# Patient Record
Sex: MALE | Race: WHITE | NOT HISPANIC OR LATINO | Employment: FULL TIME | URBAN - METROPOLITAN AREA
[De-identification: names, ages, dates, MRNs, and addresses within clinical notes are randomized per-mention and may not be internally consistent; named-entity substitution may affect disease eponyms.]

---

## 2020-12-17 ENCOUNTER — LAB VISIT (OUTPATIENT)
Dept: PRIMARY CARE CLINIC | Facility: OTHER | Age: 25
End: 2020-12-17
Attending: INTERNAL MEDICINE
Payer: OTHER GOVERNMENT

## 2020-12-17 DIAGNOSIS — Z03.818 ENCOUNTER FOR OBSERVATION FOR SUSPECTED EXPOSURE TO OTHER BIOLOGICAL AGENTS RULED OUT: ICD-10-CM

## 2020-12-17 PROCEDURE — U0003 INFECTIOUS AGENT DETECTION BY NUCLEIC ACID (DNA OR RNA); SEVERE ACUTE RESPIRATORY SYNDROME CORONAVIRUS 2 (SARS-COV-2) (CORONAVIRUS DISEASE [COVID-19]), AMPLIFIED PROBE TECHNIQUE, MAKING USE OF HIGH THROUGHPUT TECHNOLOGIES AS DESCRIBED BY CMS-2020-01-R: HCPCS

## 2020-12-18 LAB — SARS-COV-2 RNA RESP QL NAA+PROBE: NOT DETECTED

## 2021-01-30 ENCOUNTER — HOSPITAL ENCOUNTER (EMERGENCY)
Facility: HOSPITAL | Age: 26
Discharge: HOME | End: 2021-01-30
Attending: EMERGENCY MEDICINE
Payer: COMMERCIAL

## 2021-01-30 VITALS
SYSTOLIC BLOOD PRESSURE: 136 MMHG | DIASTOLIC BLOOD PRESSURE: 76 MMHG | RESPIRATION RATE: 18 BRPM | WEIGHT: 145 LBS | HEIGHT: 66 IN | HEART RATE: 80 BPM | TEMPERATURE: 98 F | BODY MASS INDEX: 23.3 KG/M2 | OXYGEN SATURATION: 99 %

## 2021-01-30 DIAGNOSIS — M79.673 FOOT PAIN: ICD-10-CM

## 2021-01-30 PROCEDURE — 99283 EMERGENCY DEPT VISIT LOW MDM: CPT | Mod: 25

## 2021-01-30 PROCEDURE — 25000003 PHARM REV CODE 250: Performed by: EMERGENCY MEDICINE

## 2021-01-30 RX ORDER — IBUPROFEN 400 MG/1
400 TABLET ORAL
Status: COMPLETED | OUTPATIENT
Start: 2021-01-30 | End: 2021-01-30

## 2021-01-30 RX ADMIN — IBUPROFEN 400 MG: 400 TABLET, FILM COATED ORAL at 03:01

## 2021-05-04 ENCOUNTER — PATIENT MESSAGE (OUTPATIENT)
Dept: RESEARCH | Facility: HOSPITAL | Age: 26
End: 2021-05-04

## 2021-05-10 ENCOUNTER — PATIENT MESSAGE (OUTPATIENT)
Dept: RESEARCH | Facility: HOSPITAL | Age: 26
End: 2021-05-10

## 2021-06-08 ENCOUNTER — OFFICE VISIT (OUTPATIENT)
Dept: FAMILY MEDICINE | Facility: CLINIC | Age: 26
End: 2021-06-08
Payer: COMMERCIAL

## 2021-06-08 VITALS
RESPIRATION RATE: 13 BRPM | HEIGHT: 66 IN | DIASTOLIC BLOOD PRESSURE: 60 MMHG | WEIGHT: 146.38 LBS | TEMPERATURE: 99 F | SYSTOLIC BLOOD PRESSURE: 100 MMHG | HEART RATE: 85 BPM | BODY MASS INDEX: 23.53 KG/M2 | OXYGEN SATURATION: 98 %

## 2021-06-08 DIAGNOSIS — M62.838 MUSCLE SPASM: ICD-10-CM

## 2021-06-08 DIAGNOSIS — K40.90 RIGHT INGUINAL HERNIA: ICD-10-CM

## 2021-06-08 DIAGNOSIS — S46.811A TRAPEZIUS MUSCLE STRAIN, RIGHT, INITIAL ENCOUNTER: ICD-10-CM

## 2021-06-08 DIAGNOSIS — R21 RASH: Primary | ICD-10-CM

## 2021-06-08 PROCEDURE — 1126F AMNT PAIN NOTED NONE PRSNT: CPT | Mod: S$GLB,,, | Performed by: NURSE PRACTITIONER

## 2021-06-08 PROCEDURE — 3008F BODY MASS INDEX DOCD: CPT | Mod: CPTII,S$GLB,, | Performed by: NURSE PRACTITIONER

## 2021-06-08 PROCEDURE — 3008F PR BODY MASS INDEX (BMI) DOCUMENTED: ICD-10-PCS | Mod: CPTII,S$GLB,, | Performed by: NURSE PRACTITIONER

## 2021-06-08 PROCEDURE — 1126F PR PAIN SEVERITY QUANTIFIED, NO PAIN PRESENT: ICD-10-PCS | Mod: S$GLB,,, | Performed by: NURSE PRACTITIONER

## 2021-06-08 PROCEDURE — 99203 PR OFFICE/OUTPT VISIT, NEW, LEVL III, 30-44 MIN: ICD-10-PCS | Mod: S$GLB,,, | Performed by: NURSE PRACTITIONER

## 2021-06-08 PROCEDURE — 99203 OFFICE O/P NEW LOW 30 MIN: CPT | Mod: S$GLB,,, | Performed by: NURSE PRACTITIONER

## 2021-06-08 RX ORDER — ESCITALOPRAM OXALATE 10 MG/1
TABLET ORAL
COMMUNITY
End: 2021-06-08 | Stop reason: ALTCHOICE

## 2021-06-08 RX ORDER — VALACYCLOVIR HYDROCHLORIDE 1 G/1
TABLET, FILM COATED ORAL
COMMUNITY
End: 2024-02-01

## 2021-06-08 RX ORDER — TIZANIDINE 2 MG/1
2 TABLET ORAL EVERY 8 HOURS PRN
Qty: 90 TABLET | Refills: 0 | Status: SHIPPED | OUTPATIENT
Start: 2021-06-08 | End: 2023-10-28

## 2021-06-08 RX ORDER — ESCITALOPRAM OXALATE 20 MG/1
20 TABLET ORAL DAILY
COMMUNITY
Start: 2021-05-27

## 2021-06-08 RX ORDER — TRETINOIN 0.1 MG/G
GEL TOPICAL
COMMUNITY
End: 2021-06-08

## 2021-06-08 RX ORDER — TRIAMCINOLONE ACETONIDE 1 MG/G
CREAM TOPICAL 2 TIMES DAILY
Qty: 80 G | Refills: 0 | Status: SHIPPED | OUTPATIENT
Start: 2021-06-08 | End: 2023-10-28

## 2021-06-08 RX ORDER — ESCITALOPRAM OXALATE 10 MG/1
10 TABLET ORAL DAILY
COMMUNITY
Start: 2021-02-22 | End: 2021-06-08 | Stop reason: SDUPTHER

## 2021-06-08 RX ORDER — CALCIPOTRIENE 50 UG/G
CREAM TOPICAL 2 TIMES DAILY
Qty: 120 G | Refills: 0 | Status: SHIPPED | OUTPATIENT
Start: 2021-06-08 | End: 2023-10-28

## 2021-06-08 RX ORDER — ACETAMINOPHEN AND CODEINE PHOSPHATE 300; 30 MG/1; MG/1
TABLET ORAL
COMMUNITY
Start: 2021-03-02 | End: 2021-06-08

## 2021-06-08 RX ORDER — ALBUTEROL SULFATE 90 UG/1
AEROSOL, METERED RESPIRATORY (INHALATION)
COMMUNITY
End: 2021-06-09 | Stop reason: SDUPTHER

## 2021-06-08 RX ORDER — AMOXICILLIN AND CLAVULANATE POTASSIUM 875; 125 MG/1; MG/1
TABLET, FILM COATED ORAL
COMMUNITY
Start: 2021-04-30 | End: 2021-06-08

## 2021-06-08 RX ORDER — IBUPROFEN 600 MG/1
600 TABLET ORAL EVERY 8 HOURS PRN
Qty: 42 TABLET | Refills: 0 | Status: SHIPPED | OUTPATIENT
Start: 2021-06-08

## 2021-06-08 RX ORDER — CLONAZEPAM 1 MG/1
1 TABLET ORAL
COMMUNITY

## 2021-06-09 DIAGNOSIS — J45.901 ASTHMA WITH ACUTE EXACERBATION, UNSPECIFIED ASTHMA SEVERITY, UNSPECIFIED WHETHER PERSISTENT: Primary | ICD-10-CM

## 2021-06-10 RX ORDER — ALBUTEROL SULFATE 90 UG/1
2 AEROSOL, METERED RESPIRATORY (INHALATION) EVERY 4 HOURS PRN
Qty: 18 G | Refills: 2 | Status: SHIPPED | OUTPATIENT
Start: 2021-06-10

## 2021-06-14 ENCOUNTER — HOSPITAL ENCOUNTER (OUTPATIENT)
Dept: RADIOLOGY | Facility: HOSPITAL | Age: 26
Discharge: HOME | End: 2021-06-14
Attending: NURSE PRACTITIONER
Payer: COMMERCIAL

## 2021-06-14 DIAGNOSIS — K40.90 RIGHT INGUINAL HERNIA: ICD-10-CM

## 2021-06-14 PROCEDURE — 76857 US EXAM PELVIC LIMITED: CPT | Mod: TC

## 2021-06-14 PROCEDURE — 76857 US PELVIS LIMITED NON OB: ICD-10-PCS | Mod: 26,,, | Performed by: RADIOLOGY

## 2021-06-14 PROCEDURE — 76857 US EXAM PELVIC LIMITED: CPT | Mod: 26,,, | Performed by: RADIOLOGY

## 2021-06-16 DIAGNOSIS — R76.8 POSITIVE HEPATITIS C ANTIBODY TEST: Primary | ICD-10-CM

## 2021-06-22 ENCOUNTER — LAB VISIT (OUTPATIENT)
Dept: LAB | Facility: HOSPITAL | Age: 26
End: 2021-06-22
Attending: NURSE PRACTITIONER
Payer: COMMERCIAL

## 2021-06-22 ENCOUNTER — OFFICE VISIT (OUTPATIENT)
Dept: FAMILY MEDICINE | Facility: CLINIC | Age: 26
End: 2021-06-22
Payer: COMMERCIAL

## 2021-06-22 VITALS
RESPIRATION RATE: 18 BRPM | HEIGHT: 66 IN | BODY MASS INDEX: 23.31 KG/M2 | TEMPERATURE: 97 F | SYSTOLIC BLOOD PRESSURE: 109 MMHG | HEART RATE: 83 BPM | DIASTOLIC BLOOD PRESSURE: 63 MMHG | OXYGEN SATURATION: 97 % | WEIGHT: 145.06 LBS

## 2021-06-22 DIAGNOSIS — R21 RASH: ICD-10-CM

## 2021-06-22 DIAGNOSIS — R76.8 POSITIVE HEPATITIS C ANTIBODY TEST: ICD-10-CM

## 2021-06-22 DIAGNOSIS — R76.8 HEPATITIS C ANTIBODY POSITIVE IN BLOOD: Primary | ICD-10-CM

## 2021-06-22 PROCEDURE — 99212 PR OFFICE/OUTPT VISIT, EST, LEVL II, 10-19 MIN: ICD-10-PCS | Mod: S$GLB,,, | Performed by: NURSE PRACTITIONER

## 2021-06-22 PROCEDURE — 1125F PR PAIN SEVERITY QUANTIFIED, PAIN PRESENT: ICD-10-PCS | Mod: S$GLB,,, | Performed by: NURSE PRACTITIONER

## 2021-06-22 PROCEDURE — 36415 COLL VENOUS BLD VENIPUNCTURE: CPT | Performed by: NURSE PRACTITIONER

## 2021-06-22 PROCEDURE — 1125F AMNT PAIN NOTED PAIN PRSNT: CPT | Mod: S$GLB,,, | Performed by: NURSE PRACTITIONER

## 2021-06-22 PROCEDURE — 99212 OFFICE O/P EST SF 10 MIN: CPT | Mod: S$GLB,,, | Performed by: NURSE PRACTITIONER

## 2021-06-22 PROCEDURE — 87902 NFCT AGT GNTYP ALYS HEP C: CPT | Performed by: NURSE PRACTITIONER

## 2021-06-22 PROCEDURE — 3008F BODY MASS INDEX DOCD: CPT | Mod: CPTII,S$GLB,, | Performed by: NURSE PRACTITIONER

## 2021-06-22 PROCEDURE — 3008F PR BODY MASS INDEX (BMI) DOCUMENTED: ICD-10-PCS | Mod: CPTII,S$GLB,, | Performed by: NURSE PRACTITIONER

## 2021-06-22 RX ORDER — HYDROCODONE BITARTRATE AND ACETAMINOPHEN 5; 325 MG/1; MG/1
1 TABLET ORAL EVERY 6 HOURS PRN
COMMUNITY
Start: 2021-06-11 | End: 2021-06-29

## 2021-06-22 RX ORDER — OXYCODONE AND ACETAMINOPHEN 5; 325 MG/1; MG/1
TABLET ORAL
COMMUNITY
Start: 2021-06-21 | End: 2021-06-29

## 2021-06-22 RX ORDER — CEPHALEXIN 500 MG/1
500 CAPSULE ORAL 4 TIMES DAILY
COMMUNITY
Start: 2021-06-21 | End: 2021-06-29

## 2021-06-22 RX ORDER — AMOXICILLIN AND CLAVULANATE POTASSIUM 875; 125 MG/1; MG/1
TABLET, FILM COATED ORAL
COMMUNITY
Start: 2021-06-11 | End: 2021-06-22 | Stop reason: ALTCHOICE

## 2021-06-22 RX ORDER — AMOXICILLIN 500 MG/1
500 CAPSULE ORAL 3 TIMES DAILY
COMMUNITY
Start: 2021-06-10 | End: 2021-06-22

## 2021-06-28 DIAGNOSIS — R76.8 HEPATITIS C ANTIBODY TEST POSITIVE: Primary | ICD-10-CM

## 2021-06-28 LAB
HCV GENTYP SERPL NAA+PROBE: ABNORMAL
HCV RNA SERPL NAA+PROBE-LOG IU: 5.4 LOG (10) IU/ML
HCV RNA SERPL QL NAA+PROBE: DETECTED
HCV RNA SPEC NAA+PROBE-ACNC: ABNORMAL IU/ML

## 2021-06-29 ENCOUNTER — PROCEDURE VISIT (OUTPATIENT)
Dept: HEPATOLOGY | Facility: CLINIC | Age: 26
End: 2021-06-29
Payer: COMMERCIAL

## 2021-06-29 ENCOUNTER — OFFICE VISIT (OUTPATIENT)
Dept: HEPATOLOGY | Facility: CLINIC | Age: 26
End: 2021-06-29
Payer: COMMERCIAL

## 2021-06-29 VITALS
BODY MASS INDEX: 22.71 KG/M2 | OXYGEN SATURATION: 99 % | TEMPERATURE: 98 F | RESPIRATION RATE: 18 BRPM | HEART RATE: 50 BPM | DIASTOLIC BLOOD PRESSURE: 61 MMHG | HEIGHT: 66 IN | WEIGHT: 141.31 LBS | SYSTOLIC BLOOD PRESSURE: 121 MMHG

## 2021-06-29 DIAGNOSIS — R76.8 HEPATITIS C ANTIBODY TEST POSITIVE: ICD-10-CM

## 2021-06-29 DIAGNOSIS — R74.8 ABNORMAL TRANSAMINASES: ICD-10-CM

## 2021-06-29 DIAGNOSIS — B18.2 CHRONIC HEPATITIS C WITHOUT HEPATIC COMA: Primary | ICD-10-CM

## 2021-06-29 DIAGNOSIS — B18.2 CHRONIC HEPATITIS C WITHOUT HEPATIC COMA: ICD-10-CM

## 2021-06-29 PROCEDURE — 91200 LIVER ELASTOGRAPHY: CPT | Mod: S$GLB,,, | Performed by: PHYSICIAN ASSISTANT

## 2021-06-29 PROCEDURE — 99999 PR PBB SHADOW E&M-EST. PATIENT-LVL V: ICD-10-PCS | Mod: PBBFAC,,, | Performed by: PHYSICIAN ASSISTANT

## 2021-06-29 PROCEDURE — 3008F BODY MASS INDEX DOCD: CPT | Mod: CPTII,S$GLB,, | Performed by: PHYSICIAN ASSISTANT

## 2021-06-29 PROCEDURE — 1126F PR PAIN SEVERITY QUANTIFIED, NO PAIN PRESENT: ICD-10-PCS | Mod: S$GLB,,, | Performed by: PHYSICIAN ASSISTANT

## 2021-06-29 PROCEDURE — 1126F AMNT PAIN NOTED NONE PRSNT: CPT | Mod: S$GLB,,, | Performed by: PHYSICIAN ASSISTANT

## 2021-06-29 PROCEDURE — 99204 OFFICE O/P NEW MOD 45 MIN: CPT | Mod: S$GLB,,, | Performed by: PHYSICIAN ASSISTANT

## 2021-06-29 PROCEDURE — 3008F PR BODY MASS INDEX (BMI) DOCUMENTED: ICD-10-PCS | Mod: CPTII,S$GLB,, | Performed by: PHYSICIAN ASSISTANT

## 2021-06-29 PROCEDURE — 91200 FIBROSCAN (VIBRATION CONTROLLED TRANSIENT ELASTOGRAPHY): ICD-10-PCS | Mod: S$GLB,,, | Performed by: PHYSICIAN ASSISTANT

## 2021-06-29 PROCEDURE — 99999 PR PBB SHADOW E&M-EST. PATIENT-LVL V: CPT | Mod: PBBFAC,,, | Performed by: PHYSICIAN ASSISTANT

## 2021-06-29 PROCEDURE — 99204 PR OFFICE/OUTPT VISIT, NEW, LEVL IV, 45-59 MIN: ICD-10-PCS | Mod: S$GLB,,, | Performed by: PHYSICIAN ASSISTANT

## 2021-06-30 ENCOUNTER — HOSPITAL ENCOUNTER (OUTPATIENT)
Dept: RADIOLOGY | Facility: HOSPITAL | Age: 26
Discharge: HOME | End: 2021-06-30
Attending: PHYSICIAN ASSISTANT
Payer: COMMERCIAL

## 2021-06-30 ENCOUNTER — LAB VISIT (OUTPATIENT)
Dept: LAB | Facility: HOSPITAL | Age: 26
End: 2021-06-30
Attending: PHYSICIAN ASSISTANT
Payer: COMMERCIAL

## 2021-06-30 DIAGNOSIS — B18.2 CHRONIC HEPATITIS C WITHOUT HEPATIC COMA: ICD-10-CM

## 2021-06-30 PROCEDURE — 86708 HEPATITIS A ANTIBODY: CPT | Performed by: PHYSICIAN ASSISTANT

## 2021-06-30 PROCEDURE — 82595 ASSAY OF CRYOGLOBULIN: CPT | Performed by: PHYSICIAN ASSISTANT

## 2021-06-30 PROCEDURE — 36415 COLL VENOUS BLD VENIPUNCTURE: CPT | Performed by: PHYSICIAN ASSISTANT

## 2021-06-30 PROCEDURE — 30000890 LABCORP MISCELLANEOUS TEST: Performed by: PHYSICIAN ASSISTANT

## 2021-06-30 PROCEDURE — 86704 HEP B CORE ANTIBODY TOTAL: CPT | Performed by: PHYSICIAN ASSISTANT

## 2021-06-30 PROCEDURE — 87340 HEPATITIS B SURFACE AG IA: CPT | Performed by: PHYSICIAN ASSISTANT

## 2021-06-30 PROCEDURE — 76700 US EXAM ABDOM COMPLETE: CPT | Mod: TC

## 2021-06-30 PROCEDURE — 86706 HEP B SURFACE ANTIBODY: CPT | Performed by: PHYSICIAN ASSISTANT

## 2021-07-01 LAB
HBV CORE AB SERPL QL IA: NEGATIVE
HBV SURFACE AB SER QL: NON REACTIVE
HBV SURFACE AG SERPL QL IA: NEGATIVE
LABCORP MISC TEST CODE: 6726
LABCORP MISC TEST NAME: NORMAL
LABCORP MISCELLANEOUS TEST: NORMAL

## 2021-07-06 LAB — CRYOGLOB SER QL 1D COLD INC: NORMAL

## 2021-08-13 ENCOUNTER — SPECIALTY PHARMACY (OUTPATIENT)
Dept: PHARMACY | Facility: CLINIC | Age: 26
End: 2021-08-13

## 2021-08-13 ENCOUNTER — OFFICE VISIT (OUTPATIENT)
Dept: HEPATOLOGY | Facility: CLINIC | Age: 26
End: 2021-08-13
Payer: COMMERCIAL

## 2021-08-13 ENCOUNTER — PATIENT MESSAGE (OUTPATIENT)
Dept: PHARMACY | Facility: CLINIC | Age: 26
End: 2021-08-13

## 2021-08-13 VITALS
TEMPERATURE: 98 F | HEIGHT: 66 IN | RESPIRATION RATE: 16 BRPM | DIASTOLIC BLOOD PRESSURE: 65 MMHG | WEIGHT: 145.5 LBS | SYSTOLIC BLOOD PRESSURE: 120 MMHG | HEART RATE: 84 BPM | OXYGEN SATURATION: 98 % | BODY MASS INDEX: 23.38 KG/M2

## 2021-08-13 DIAGNOSIS — B18.2 CHRONIC HEPATITIS C WITHOUT HEPATIC COMA: Primary | ICD-10-CM

## 2021-08-13 DIAGNOSIS — K82.4 GALLBLADDER POLYP: ICD-10-CM

## 2021-08-13 PROCEDURE — 90739 HEPB VACC 2/4 DOSE ADULT IM: CPT | Mod: PBBFAC | Performed by: PHARMACIST

## 2021-08-13 PROCEDURE — 99213 PR OFFICE/OUTPT VISIT, EST, LEVL III, 20-29 MIN: ICD-10-PCS | Mod: S$PBB,ICN,, | Performed by: PHYSICIAN ASSISTANT

## 2021-08-13 PROCEDURE — 90471 IMMUNIZATION ADMIN: CPT | Mod: PBBFAC | Performed by: PHARMACIST

## 2021-08-13 PROCEDURE — 99213 OFFICE O/P EST LOW 20 MIN: CPT | Mod: S$PBB,ICN,, | Performed by: PHYSICIAN ASSISTANT

## 2021-08-13 PROCEDURE — 99999 PR PBB SHADOW E&M-EST. PATIENT-LVL III: CPT | Mod: PBBFAC,,, | Performed by: PHYSICIAN ASSISTANT

## 2021-08-13 PROCEDURE — 99999 PR PBB SHADOW E&M-EST. PATIENT-LVL III: ICD-10-PCS | Mod: PBBFAC,,, | Performed by: PHYSICIAN ASSISTANT

## 2021-08-13 RX ORDER — LEDIPASVIR AND SOFOSBUVIR 90; 400 MG/1; MG/1
1 TABLET, FILM COATED ORAL DAILY
Qty: 28 TABLET | Refills: 1 | Status: SHIPPED | OUTPATIENT
Start: 2021-08-13 | End: 2023-10-28

## 2021-08-13 RX ORDER — HEPATITIS B VACCINE (RECOMBINANT) ADJUVANTED 20 UG/.5ML
20 INJECTION, SOLUTION INTRAMUSCULAR ONCE
Qty: 0.5 ML | Refills: 1 | Status: SHIPPED | OUTPATIENT
Start: 2021-08-13 | End: 2021-08-14

## 2021-08-17 ENCOUNTER — OFFICE VISIT (OUTPATIENT)
Dept: DERMATOLOGY | Facility: CLINIC | Age: 26
End: 2021-08-17
Payer: COMMERCIAL

## 2021-08-17 DIAGNOSIS — R21 RASH: ICD-10-CM

## 2021-08-17 DIAGNOSIS — B36.0 TINEA VERSICOLOR: Primary | ICD-10-CM

## 2021-08-17 PROCEDURE — 99999 PR PBB SHADOW E&M-EST. PATIENT-LVL III: CPT | Mod: PBBFAC,,, | Performed by: DERMATOLOGY

## 2021-08-17 PROCEDURE — 99999 PR PBB SHADOW E&M-EST. PATIENT-LVL III: ICD-10-PCS | Mod: PBBFAC,,, | Performed by: DERMATOLOGY

## 2021-08-17 PROCEDURE — 99204 PR OFFICE/OUTPT VISIT, NEW, LEVL IV, 45-59 MIN: ICD-10-PCS | Mod: S$PBB,,, | Performed by: DERMATOLOGY

## 2021-08-17 PROCEDURE — 99204 OFFICE O/P NEW MOD 45 MIN: CPT | Mod: S$PBB,,, | Performed by: DERMATOLOGY

## 2021-08-17 PROCEDURE — 87220 TISSUE EXAM FOR FUNGI: CPT | Mod: PBBFAC,PO | Performed by: DERMATOLOGY

## 2021-08-17 RX ORDER — FLUCONAZOLE 150 MG/1
TABLET ORAL
Qty: 4 TABLET | Refills: 0 | Status: SHIPPED | OUTPATIENT
Start: 2021-08-17 | End: 2023-10-28

## 2021-08-17 RX ORDER — KETOCONAZOLE 20 MG/ML
SHAMPOO, SUSPENSION TOPICAL
Qty: 120 ML | Refills: 5 | Status: SHIPPED | OUTPATIENT
Start: 2021-08-17 | End: 2023-10-28

## 2021-08-23 ENCOUNTER — SPECIALTY PHARMACY (OUTPATIENT)
Dept: PHARMACY | Facility: CLINIC | Age: 26
End: 2021-08-23

## 2021-08-26 ENCOUNTER — TELEPHONE (OUTPATIENT)
Dept: HEPATOLOGY | Facility: CLINIC | Age: 26
End: 2021-08-26

## 2021-08-26 DIAGNOSIS — K82.4 GALLBLADDER POLYP: ICD-10-CM

## 2021-08-26 DIAGNOSIS — B18.2 CHRONIC HEPATITIS C WITHOUT HEPATIC COMA: Primary | ICD-10-CM

## 2021-08-27 DIAGNOSIS — B18.2 CHRONIC HEPATITIS C WITHOUT HEPATIC COMA: Primary | ICD-10-CM

## 2021-09-27 ENCOUNTER — TELEPHONE (OUTPATIENT)
Dept: HEPATOLOGY | Facility: CLINIC | Age: 26
End: 2021-09-27

## 2021-09-27 ENCOUNTER — PATIENT MESSAGE (OUTPATIENT)
Dept: PHARMACY | Facility: CLINIC | Age: 26
End: 2021-09-27

## 2021-10-05 ENCOUNTER — TELEPHONE (OUTPATIENT)
Dept: HEPATOLOGY | Facility: CLINIC | Age: 26
End: 2021-10-05

## 2021-10-05 DIAGNOSIS — B18.2 CHRONIC HEPATITIS C WITHOUT HEPATIC COMA: ICD-10-CM

## 2022-07-07 ENCOUNTER — OCCUPATIONAL HEALTH (OUTPATIENT)
Dept: URGENT CARE | Facility: CLINIC | Age: 27
End: 2022-07-07

## 2022-07-07 PROCEDURE — 80305 DRUG TEST PRSMV DIR OPT OBS: CPT | Mod: S$GLB,,, | Performed by: STUDENT IN AN ORGANIZED HEALTH CARE EDUCATION/TRAINING PROGRAM

## 2022-07-07 PROCEDURE — 80305 PR DRUG SCREEN - 1: ICD-10-PCS | Mod: S$GLB,,, | Performed by: STUDENT IN AN ORGANIZED HEALTH CARE EDUCATION/TRAINING PROGRAM

## 2023-10-28 ENCOUNTER — OFFICE VISIT (OUTPATIENT)
Dept: URGENT CARE | Facility: CLINIC | Age: 28
End: 2023-10-28

## 2023-10-28 VITALS
DIASTOLIC BLOOD PRESSURE: 68 MMHG | HEART RATE: 59 BPM | OXYGEN SATURATION: 100 % | TEMPERATURE: 99 F | WEIGHT: 148 LBS | HEIGHT: 66 IN | SYSTOLIC BLOOD PRESSURE: 111 MMHG | RESPIRATION RATE: 16 BRPM | BODY MASS INDEX: 23.78 KG/M2

## 2023-10-28 DIAGNOSIS — R35.0 FREQUENCY OF URINATION: Primary | ICD-10-CM

## 2023-10-28 DIAGNOSIS — R30.0 DYSURIA: ICD-10-CM

## 2023-10-28 DIAGNOSIS — Z11.3 SCREENING FOR STD (SEXUALLY TRANSMITTED DISEASE): ICD-10-CM

## 2023-10-28 LAB
BILIRUB UR QL STRIP: NEGATIVE
GLUCOSE UR QL STRIP: NEGATIVE
KETONES UR QL STRIP: NEGATIVE
LEUKOCYTE ESTERASE UR QL STRIP: NEGATIVE
PH, POC UA: 6
POC BLOOD, URINE: NEGATIVE
POC NITRATES, URINE: NEGATIVE
PROT UR QL STRIP: NEGATIVE
SP GR UR STRIP: 1.01 (ref 1–1.03)
UROBILINOGEN UR STRIP-ACNC: NORMAL (ref 0.3–2.2)

## 2023-10-28 PROCEDURE — 99203 PR OFFICE/OUTPT VISIT, NEW, LEVL III, 30-44 MIN: ICD-10-PCS | Mod: TIER,25,S$GLB, | Performed by: STUDENT IN AN ORGANIZED HEALTH CARE EDUCATION/TRAINING PROGRAM

## 2023-10-28 PROCEDURE — 81003 URINALYSIS AUTO W/O SCOPE: CPT | Mod: QW,S$GLB,, | Performed by: STUDENT IN AN ORGANIZED HEALTH CARE EDUCATION/TRAINING PROGRAM

## 2023-10-28 PROCEDURE — 81003 POCT URINALYSIS, DIPSTICK, AUTOMATED, W/O SCOPE: ICD-10-PCS | Mod: QW,S$GLB,, | Performed by: STUDENT IN AN ORGANIZED HEALTH CARE EDUCATION/TRAINING PROGRAM

## 2023-10-28 PROCEDURE — 96372 PR INJECTION,THERAP/PROPH/DIAG2ST, IM OR SUBCUT: ICD-10-PCS | Mod: S$GLB,,, | Performed by: STUDENT IN AN ORGANIZED HEALTH CARE EDUCATION/TRAINING PROGRAM

## 2023-10-28 PROCEDURE — 99203 OFFICE O/P NEW LOW 30 MIN: CPT | Mod: TIER,25,S$GLB, | Performed by: STUDENT IN AN ORGANIZED HEALTH CARE EDUCATION/TRAINING PROGRAM

## 2023-10-28 PROCEDURE — 96372 THER/PROPH/DIAG INJ SC/IM: CPT | Mod: S$GLB,,, | Performed by: STUDENT IN AN ORGANIZED HEALTH CARE EDUCATION/TRAINING PROGRAM

## 2023-10-28 RX ORDER — CEFTRIAXONE 1 G/1
1 INJECTION, POWDER, FOR SOLUTION INTRAMUSCULAR; INTRAVENOUS
Status: COMPLETED | OUTPATIENT
Start: 2023-10-28 | End: 2023-10-28

## 2023-10-28 RX ORDER — DOXYCYCLINE HYCLATE 100 MG
100 TABLET ORAL 2 TIMES DAILY
Qty: 14 TABLET | Refills: 0 | Status: SHIPPED | OUTPATIENT
Start: 2023-10-28 | End: 2023-11-04

## 2023-10-28 RX ORDER — METRONIDAZOLE 500 MG/1
2000 TABLET ORAL ONCE
Qty: 4 TABLET | Refills: 0 | Status: SHIPPED | OUTPATIENT
Start: 2023-10-28 | End: 2023-10-28

## 2023-10-28 RX ADMIN — CEFTRIAXONE 1 G: 1 INJECTION, POWDER, FOR SOLUTION INTRAMUSCULAR; INTRAVENOUS at 03:10

## 2023-10-28 NOTE — PROGRESS NOTES
"Subjective:      Patient ID: David Knutson is a 28 y.o. male.    Vitals:  height is 5' 6" (1.676 m) and weight is 67.1 kg (148 lb). His oral temperature is 98.6 °F (37 °C). His blood pressure is 111/68 and his pulse is 59 (abnormal). His respiration is 16 and oxygen saturation is 100%.     Chief Complaint: Urinary Frequency (burning)    Patient is a 28-year-old male with a past medical history of seizures and hepatitis-C who presents to clinic for evaluation of possible UTI versus STD.  Patient states that he has experienced symptoms for nearly 1 week now.  Patient states that he has experienced dysuria, urinary frequency and urinary urgency.  Patient states that he is unknown if he may have STD.  Patient states he was recently sexually active with a female out of state.  Patient states he did wear condom however it broke.  Patient states he is concern for this and would like treatment.  Patient states that he does not have any history of recurrent UTIs, prostatitis, or kidney stones.  Patient states that he has not experienced any testicular pain or swelling, penile pain or swelling, painful ejaculation, penile discharge, general sores, hematuria, urinary hesitancy or retention, flank pain or pelvic pain, back pain, abdominal pain, nausea or vomiting, fever or chills.  Patient states no over-the-counter medications for symptoms at this point.    Urinary Frequency   This is a new problem. The current episode started in the past 7 days (7 days). The problem occurs every urination. The problem has been gradually worsening. The quality of the pain is described as burning. The pain is at a severity of 5/10. The pain is moderate. There has been no fever. He is Sexually active. There is No history of pyelonephritis. Associated symptoms include frequency and urgency. Pertinent negatives include no chills, flank pain, hematuria, nausea, vomiting or rash. He has tried nothing for the symptoms. The treatment provided no " relief.       Constitution: Negative. Negative for chills, sweating, fatigue and fever.   HENT: Negative.     Neck: neck negative.   Cardiovascular: Negative.  Negative for chest pain and palpitations.   Eyes: Negative.    Respiratory: Negative.  Negative for chest tightness, cough and shortness of breath.    Gastrointestinal:  Negative for abdominal pain, nausea, vomiting and diarrhea.   Endocrine: negative.   Genitourinary:  Positive for dysuria, frequency and urgency. Negative for urine decreased, flank pain, hematuria, history of kidney stones, genital sore, penile discharge, painful ejaculation, penile pain, penile swelling, scrotal swelling, testicular pain and pelvic pain.   Musculoskeletal: Negative.  Negative for back pain.   Skin: Negative.  Negative for color change, pale, rash and erythema.   Allergic/Immunologic: Negative.    Neurological: Negative.  Negative for dizziness, light-headedness, passing out, headaches, disorientation and altered mental status.   Hematologic/Lymphatic: Negative.    Psychiatric/Behavioral: Negative.  Negative for altered mental status, disorientation and confusion.       Objective:     Physical Exam   Constitutional: He is oriented to person, place, and time. He appears well-developed. He is cooperative.  Non-toxic appearance. He does not appear ill. No distress.   HENT:   Head: Normocephalic and atraumatic.   Ears:   Right Ear: Hearing, tympanic membrane, external ear and ear canal normal.   Left Ear: Hearing, tympanic membrane, external ear and ear canal normal.   Nose: Nose normal. No mucosal edema or nasal deformity. No epistaxis. Right sinus exhibits no maxillary sinus tenderness and no frontal sinus tenderness. Left sinus exhibits no maxillary sinus tenderness and no frontal sinus tenderness.   Mouth/Throat: Uvula is midline, oropharynx is clear and moist and mucous membranes are normal. No trismus in the jaw. Normal dentition. No uvula swelling.   Eyes: Conjunctivae  and lids are normal. Pupils are equal, round, and reactive to light.   Neck: Trachea normal and phonation normal. Neck supple.   Cardiovascular: Regular rhythm, normal heart sounds and normal pulses. Bradycardia present.   Pulmonary/Chest: Effort normal and breath sounds normal. No respiratory distress. He has no wheezes. He has no rhonchi. He has no rales.   Abdominal: Normal appearance and bowel sounds are normal. He exhibits no distension. Soft. There is no abdominal tenderness. There is no rebound, no guarding, no left CVA tenderness and no right CVA tenderness.   Genitourinary:         Comments: Deferred as refused physical examination.     Musculoskeletal: Normal range of motion.         General: Normal range of motion.   Neurological: He is alert and oriented to person, place, and time. He exhibits normal muscle tone.   Skin: Skin is warm, dry, intact, not diaphoretic, not pale and no rash. Capillary refill takes less than 2 seconds. No erythema   Psychiatric: His speech is normal and behavior is normal. Judgment and thought content normal.   Nursing note and vitals reviewed.      Assessment:     1. Frequency of urination    2. Dysuria    3. Screening for STD (sexually transmitted disease)        Plan:       Frequency of urination  -     POCT Urinalysis, Dipstick, Automated, W/O Scope  -     Urine culture  -     CT/NG, T. vaginalis; Future; Expected date: 10/28/2023    Dysuria  -     Urine culture  -     CT/NG, T. vaginalis; Future; Expected date: 10/28/2023    Screening for STD (sexually transmitted disease)  -     Urine culture  -     CT/NG, T. vaginalis; Future; Expected date: 10/28/2023    Other orders  -     cefTRIAXone injection 1 g  -     doxycycline (VIBRA-TABS) 100 MG tablet; Take 1 tablet (100 mg total) by mouth 2 (two) times daily. for 7 days  Dispense: 14 tablet; Refill: 0  -     metroNIDAZOLE (FLAGYL) 500 MG tablet; Take 4 tablets (2,000 mg total) by mouth once. for 1 dose  Dispense: 4 tablet;  Refill: 0                Labs:  UA: Negative   Urine culture ordered, will call with results.    STI panel collected; will call with results.    Patient wishes to be treated prophylactically for possible STI prior to receiving results.    Rocephin 1 g IM in clinic.  Patient tolerated well.  No complications noted.  Take medications as prescribed.    Tylenol/Motrin per package instructions for any pain or fever.    Assure adequate hydration.    Recommend avoidance of sexual intercourse until receiving results.    Follow-up with PCP in 1-2 days.    Return to clinic as needed.    To ED for any new acutely worsening symptoms.    Patient in agreement with plan of care.    DISCLAIMER: Please note that my documentation in this Electronic Healthcare Record was produced using speech recognition software and therefore may contain errors related to that software system.These could include grammar, punctuation and spelling errors or the inclusion/exclusion of phrases that were not intended. Garbled syntax, mangled pronouns, and other bizarre constructions may be attributed to that software system.

## 2023-10-30 LAB
C TRACH RRNA SPEC QL NAA+PROBE: NORMAL
N GONORRHOEA RRNA SPEC QL NAA+PROBE: NORMAL
SPECIMEN STATUS REPORT: NORMAL
T VAGINALIS RRNA SPEC QL NAA+PROBE: NORMAL

## 2023-11-02 LAB
BACTERIA UR CULT: NO GROWTH
BACTERIA UR CULT: NORMAL

## 2023-11-10 ENCOUNTER — TELEPHONE (OUTPATIENT)
Dept: URGENT CARE | Facility: CLINIC | Age: 28
End: 2023-11-10

## 2023-11-10 NOTE — TELEPHONE ENCOUNTER
Called the primary phone number and received a message that the call cannot be completed at this time.

## 2024-01-29 NOTE — PROGRESS NOTES
[facility] [place]   OFFICE VISIT NOTE    Patient Name: David Knutson  YOB: 1995    PRESENTING HISTORY     History of Present Illness:  Mr. David Knutson is a 28 y.o. male coming in to [facility] care.    It appears that he is being followed by Psychiatry in [place] and gets Klonopin   last checked.  Last fill clonazepam 0.5 mg, 60 quantity filled on .    It appears that he is also on dextroamphetamine-amphetamine 20 mg for ADHD.  Also filled on the same day  Other medications: Lexapro 20 mg daily, albuterol.    Patient states that he has not seen a provider in 2 years.    He unexpectedly lost his brother 2 years ago and has not been taking care of himself.    He saw hepatology for hepatitis-C treatment.  He completed 1 month treatment out of 3, however, he has not finished the entire treatment.    Endorses having anxiety since his brother passed and is seeing Psychiatry for that.    He is working at a  home.  Endorses smoking marijuana occasionally.      He also reports having occasional chest wall pain.    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    He has not worked in construction in the last 2 months, however, he still has intermittent chest wall pain anteriorly that causes him to have sharp pain without any radiation for couple of minutes.  He also endorses that his paternal grandfather had myocardial infarction at the age of 29 and had multiple stents placed.  He is still living.  Denies any ongoing chest pain or shortness a breath today    Review of Systems   Constitutional:  Negative for chills, diaphoresis, fatigue and fever.   Respiratory:  Negative for cough, shortness of breath and wheezing.    Cardiovascular:  Negative for chest pain and palpitations.   Gastrointestinal:  Negative for constipation, diarrhea, nausea and vomiting.   Genitourinary:  Negative for bladder incontinence.   Neurological:  Negative for  Procedure confirmed  Endoscopy     Via: Spoke with patient  Instructions given: Email     Prep Given: N/A    Call the office if there are any questions  Spoke with pt and her   Confirmed Eliquis hold   Last dose is 3/25/23 "coordination difficulties.   Psychiatric/Behavioral:  Negative for behavioral problems.           OBJECTIVE:   Vital Signs:  Vitals:    01/30/24 0850   BP: 112/68   Pulse: 84   Resp: 16   SpO2: 99%   Weight: 60.9 kg (134 lb 4.2 oz)   Height: 5' 6" (1.676 m)           Physical Exam  Constitutional:       General: He is not in acute distress.     Appearance: He is not ill-appearing or toxic-appearing.   HENT:      Head: Normocephalic and atraumatic.      Mouth/Throat:      Mouth: Mucous membranes are moist.      Pharynx: Uvula midline. No pharyngeal swelling.   Cardiovascular:      Rate and Rhythm: Normal rate and regular rhythm.      Heart sounds: No murmur heard.     Comments: Chest wall pain nonreproducible with palpation   Pulmonary:      Effort: Pulmonary effort is normal. No tachypnea, bradypnea, accessory muscle usage, prolonged expiration or respiratory distress.      Breath sounds: Normal breath sounds. No stridor. No wheezing, rhonchi or rales.   Neurological:      General: No focal deficit present.      Mental Status: He is alert.   Psychiatric:         Mood and Affect: Mood normal.         Behavior: Behavior normal.         ASSESSMENT & PLAN:     Routine general medical examination at a health care facility  -     CBC Without Differential; Future; Expected date: 01/30/2024  -     Comprehensive Metabolic Panel; Future; Expected date: 01/30/2024  -     Hemoglobin A1C; Future; Expected date: 01/30/2024  -     Lipid Panel; Future; Expected date: 01/30/2024  -     HIV 1/2 Ag/Ab (4th Gen); Future; Expected date: 01/30/2024  -     RPR; Future; Expected date: 01/30/2024  -     C. trachomatis/N. gonorrhoeae by AMP DNA [facility]; Urine; Future; Expected date: 01/30/2024  -     Hepatitis C Antibody; Future; Expected date: 01/30/2024  -     HEPATITIS C RNA, QUANTITATIVE, PCR; Future; Expected date: 01/30/2024  Advised patient to wear condoms consistently for any sexual activity.    Chronic hepatitis C without hepatic " coma  -     Hepatitis C Antibody; Future; Expected date: 01/30/2024  -     HEPATITIS C RNA, QUANTITATIVE, PCR; Future; Expected date: 01/30/2024  -     Ambulatory referral/consult to Hepatology; Future; Expected date: 02/06/2024  Emphasized the importance of hepatitis-C treatment.  We will send him back to hepatology so he can complete the treatment    Chest wall pain  -     IN OFFICE EKG 12-LEAD (to [place])  -     Ambulatory referral/consult to Cardiology; Future; Expected date: 02/06/2024  In-house EKG showed normal sinus rhythm without any ST elevation.  Due to significant family medical history of paternal grandfather who had myocardial infarction at the age of 29 with multiple stent placement, it is advisable day the patient also follow up with Cardiology  I also gave patient ED precautions for worsening chest pain with radiation, shortness on breath, diaphoresis and he verbalized understanding.     Follow up in 1 year or sooner if needed    Larissa Ocampo MD  Family Medicine  [facility] [place]     This note was created using [employer] voice recognition software that occasionally misinterprets phrases or words

## 2024-01-30 ENCOUNTER — OFFICE VISIT (OUTPATIENT)
Dept: FAMILY MEDICINE | Facility: CLINIC | Age: 29
End: 2024-01-30
Payer: COMMERCIAL

## 2024-01-30 ENCOUNTER — LAB VISIT (OUTPATIENT)
Dept: LAB | Facility: HOSPITAL | Age: 29
End: 2024-01-30
Attending: STUDENT IN AN ORGANIZED HEALTH CARE EDUCATION/TRAINING PROGRAM
Payer: COMMERCIAL

## 2024-01-30 VITALS
HEIGHT: 66 IN | DIASTOLIC BLOOD PRESSURE: 68 MMHG | WEIGHT: 134.25 LBS | RESPIRATION RATE: 16 BRPM | SYSTOLIC BLOOD PRESSURE: 112 MMHG | BODY MASS INDEX: 21.57 KG/M2 | OXYGEN SATURATION: 99 % | HEART RATE: 84 BPM

## 2024-01-30 DIAGNOSIS — B18.2 CHRONIC HEPATITIS C WITHOUT HEPATIC COMA: ICD-10-CM

## 2024-01-30 DIAGNOSIS — Z00.00 ROUTINE GENERAL MEDICAL EXAMINATION AT A HEALTH CARE FACILITY: ICD-10-CM

## 2024-01-30 DIAGNOSIS — R07.89 CHEST WALL PAIN: ICD-10-CM

## 2024-01-30 DIAGNOSIS — Z00.00 ROUTINE GENERAL MEDICAL EXAMINATION AT A HEALTH CARE FACILITY: Primary | ICD-10-CM

## 2024-01-30 LAB
ALBUMIN SERPL BCP-MCNC: 4.4 G/DL (ref 3.5–5.2)
ALP SERPL-CCNC: 65 U/L (ref 55–135)
ALT SERPL W/O P-5'-P-CCNC: 54 U/L (ref 10–44)
ANION GAP SERPL CALC-SCNC: 10 MMOL/L (ref 8–16)
AST SERPL-CCNC: 25 U/L (ref 10–40)
BILIRUB SERPL-MCNC: 0.4 MG/DL (ref 0.1–1)
BUN SERPL-MCNC: 9 MG/DL (ref 6–20)
CALCIUM SERPL-MCNC: 10 MG/DL (ref 8.7–10.5)
CHLORIDE SERPL-SCNC: 106 MMOL/L (ref 95–110)
CHOLEST SERPL-MCNC: 162 MG/DL (ref 120–199)
CHOLEST/HDLC SERPL: 3.7 {RATIO} (ref 2–5)
CO2 SERPL-SCNC: 26 MMOL/L (ref 23–29)
CREAT SERPL-MCNC: 0.9 MG/DL (ref 0.5–1.4)
ERYTHROCYTE [DISTWIDTH] IN BLOOD BY AUTOMATED COUNT: 12.8 % (ref 11.5–14.5)
EST. GFR  (NO RACE VARIABLE): >60 ML/MIN/1.73 M^2
ESTIMATED AVG GLUCOSE: 105 MG/DL (ref 68–131)
GLUCOSE SERPL-MCNC: 81 MG/DL (ref 70–110)
HBA1C MFR BLD: 5.3 % (ref 4–5.6)
HCT VFR BLD AUTO: 45.8 % (ref 40–54)
HDLC SERPL-MCNC: 44 MG/DL (ref 40–75)
HDLC SERPL: 27.2 % (ref 20–50)
HGB BLD-MCNC: 15.2 G/DL (ref 14–18)
LDLC SERPL CALC-MCNC: 105.8 MG/DL (ref 63–159)
MCH RBC QN AUTO: 30.5 PG (ref 27–31)
MCHC RBC AUTO-ENTMCNC: 33.2 G/DL (ref 32–36)
MCV RBC AUTO: 92 FL (ref 82–98)
NONHDLC SERPL-MCNC: 118 MG/DL
PLATELET # BLD AUTO: 328 K/UL (ref 150–450)
PMV BLD AUTO: 9.8 FL (ref 9.2–12.9)
POTASSIUM SERPL-SCNC: 4.1 MMOL/L (ref 3.5–5.1)
PROT SERPL-MCNC: 7.6 G/DL (ref 6–8.4)
RBC # BLD AUTO: 4.99 M/UL (ref 4.6–6.2)
SODIUM SERPL-SCNC: 142 MMOL/L (ref 136–145)
TRIGL SERPL-MCNC: 61 MG/DL (ref 30–150)
WBC # BLD AUTO: 8.96 K/UL (ref 3.9–12.7)

## 2024-01-30 PROCEDURE — 99999 PR PBB SHADOW E&M-EST. PATIENT-LVL V: CPT | Mod: PBBFAC,,, | Performed by: STUDENT IN AN ORGANIZED HEALTH CARE EDUCATION/TRAINING PROGRAM

## 2024-01-30 PROCEDURE — 85027 COMPLETE CBC AUTOMATED: CPT | Performed by: STUDENT IN AN ORGANIZED HEALTH CARE EDUCATION/TRAINING PROGRAM

## 2024-01-30 PROCEDURE — 3078F DIAST BP <80 MM HG: CPT | Mod: CPTII,S$GLB,, | Performed by: STUDENT IN AN ORGANIZED HEALTH CARE EDUCATION/TRAINING PROGRAM

## 2024-01-30 PROCEDURE — 87522 HEPATITIS C REVRS TRNSCRPJ: CPT | Performed by: STUDENT IN AN ORGANIZED HEALTH CARE EDUCATION/TRAINING PROGRAM

## 2024-01-30 PROCEDURE — 93005 ELECTROCARDIOGRAM TRACING: CPT | Mod: S$GLB,,, | Performed by: STUDENT IN AN ORGANIZED HEALTH CARE EDUCATION/TRAINING PROGRAM

## 2024-01-30 PROCEDURE — 86803 HEPATITIS C AB TEST: CPT | Performed by: STUDENT IN AN ORGANIZED HEALTH CARE EDUCATION/TRAINING PROGRAM

## 2024-01-30 PROCEDURE — 3008F BODY MASS INDEX DOCD: CPT | Mod: CPTII,S$GLB,, | Performed by: STUDENT IN AN ORGANIZED HEALTH CARE EDUCATION/TRAINING PROGRAM

## 2024-01-30 PROCEDURE — 93010 ELECTROCARDIOGRAM REPORT: CPT | Mod: S$GLB,,, | Performed by: INTERNAL MEDICINE

## 2024-01-30 PROCEDURE — 87389 HIV-1 AG W/HIV-1&-2 AB AG IA: CPT | Performed by: STUDENT IN AN ORGANIZED HEALTH CARE EDUCATION/TRAINING PROGRAM

## 2024-01-30 PROCEDURE — 1160F RVW MEDS BY RX/DR IN RCRD: CPT | Mod: CPTII,S$GLB,, | Performed by: STUDENT IN AN ORGANIZED HEALTH CARE EDUCATION/TRAINING PROGRAM

## 2024-01-30 PROCEDURE — 86592 SYPHILIS TEST NON-TREP QUAL: CPT | Performed by: STUDENT IN AN ORGANIZED HEALTH CARE EDUCATION/TRAINING PROGRAM

## 2024-01-30 PROCEDURE — 83036 HEMOGLOBIN GLYCOSYLATED A1C: CPT | Performed by: STUDENT IN AN ORGANIZED HEALTH CARE EDUCATION/TRAINING PROGRAM

## 2024-01-30 PROCEDURE — 99395 PREV VISIT EST AGE 18-39: CPT | Mod: S$GLB,,, | Performed by: STUDENT IN AN ORGANIZED HEALTH CARE EDUCATION/TRAINING PROGRAM

## 2024-01-30 PROCEDURE — 80061 LIPID PANEL: CPT | Performed by: STUDENT IN AN ORGANIZED HEALTH CARE EDUCATION/TRAINING PROGRAM

## 2024-01-30 PROCEDURE — 36415 COLL VENOUS BLD VENIPUNCTURE: CPT | Mod: PO | Performed by: STUDENT IN AN ORGANIZED HEALTH CARE EDUCATION/TRAINING PROGRAM

## 2024-01-30 PROCEDURE — 80053 COMPREHEN METABOLIC PANEL: CPT | Performed by: STUDENT IN AN ORGANIZED HEALTH CARE EDUCATION/TRAINING PROGRAM

## 2024-01-30 PROCEDURE — 1159F MED LIST DOCD IN RCRD: CPT | Mod: CPTII,S$GLB,, | Performed by: STUDENT IN AN ORGANIZED HEALTH CARE EDUCATION/TRAINING PROGRAM

## 2024-01-30 PROCEDURE — 3074F SYST BP LT 130 MM HG: CPT | Mod: CPTII,S$GLB,, | Performed by: STUDENT IN AN ORGANIZED HEALTH CARE EDUCATION/TRAINING PROGRAM

## 2024-01-30 RX ORDER — DEXTROAMPHETAMINE SACCHARATE, AMPHETAMINE ASPARTATE, DEXTROAMPHETAMINE SULFATE AND AMPHETAMINE SULFATE 5; 5; 5; 5 MG/1; MG/1; MG/1; MG/1
1 TABLET ORAL 2 TIMES DAILY
COMMUNITY
Start: 2024-01-22

## 2024-01-30 NOTE — PATIENT INSTRUCTIONS
Tima Hernandez,     If you are due for any health screening(s) below please notify me so we can arrange them to be ordered and scheduled. Most healthy patients at your age complete them, but you are free to accept or refuse.     If you can't do it, I'll definitely understand. If you can, I'd certainly appreciate it!    All of your core healthy metrics are met.

## 2024-01-31 ENCOUNTER — PATIENT MESSAGE (OUTPATIENT)
Dept: FAMILY MEDICINE | Facility: CLINIC | Age: 29
End: 2024-01-31
Payer: COMMERCIAL

## 2024-01-31 ENCOUNTER — TELEPHONE (OUTPATIENT)
Dept: HEPATOLOGY | Facility: CLINIC | Age: 29
End: 2024-01-31
Payer: COMMERCIAL

## 2024-01-31 DIAGNOSIS — A74.9 CHLAMYDIA INFECTION: Primary | ICD-10-CM

## 2024-01-31 LAB
HCV AB SERPL QL IA: REACTIVE
HIV 1+2 AB+HIV1 P24 AG SERPL QL IA: NORMAL
RPR SER QL: NORMAL

## 2024-01-31 RX ORDER — DOXYCYCLINE HYCLATE 100 MG
100 TABLET ORAL 2 TIMES DAILY
Qty: 14 TABLET | Refills: 0 | Status: SHIPPED | OUTPATIENT
Start: 2024-01-31 | End: 2024-02-06 | Stop reason: ALTCHOICE

## 2024-01-31 NOTE — TELEPHONE ENCOUNTER
Dr. Larissa Ocampo ordered that patient be scheduled for a hepatology consult visit.  Patient last seen by PA Scheuermann in 2021.  He was lost to f/u.  I spoke with him. He states that he has not received treatment for hep c.  Appt with PA Scheuermann scheduled 2/1/24.

## 2024-02-01 ENCOUNTER — HOSPITAL ENCOUNTER (OUTPATIENT)
Dept: RADIOLOGY | Facility: HOSPITAL | Age: 29
Discharge: HOME | End: 2024-02-01
Attending: PHYSICIAN ASSISTANT
Payer: COMMERCIAL

## 2024-02-01 ENCOUNTER — OFFICE VISIT (OUTPATIENT)
Dept: HEPATOLOGY | Facility: CLINIC | Age: 29
End: 2024-02-01
Payer: COMMERCIAL

## 2024-02-01 ENCOUNTER — PROCEDURE VISIT (OUTPATIENT)
Dept: HEPATOLOGY | Facility: CLINIC | Age: 29
End: 2024-02-01
Payer: COMMERCIAL

## 2024-02-01 VITALS — HEIGHT: 66 IN | BODY MASS INDEX: 20.65 KG/M2 | WEIGHT: 128.5 LBS

## 2024-02-01 DIAGNOSIS — B00.9 HERPES INFECTION: Primary | ICD-10-CM

## 2024-02-01 DIAGNOSIS — R74.8 ABNORMAL TRANSAMINASES: Primary | ICD-10-CM

## 2024-02-01 DIAGNOSIS — B18.2 CHRONIC HEPATITIS C WITHOUT HEPATIC COMA: ICD-10-CM

## 2024-02-01 DIAGNOSIS — K82.4 GALLBLADDER POLYP: ICD-10-CM

## 2024-02-01 PROCEDURE — 99999 PR PBB SHADOW E&M-EST. PATIENT-LVL III: CPT | Mod: PBBFAC,,, | Performed by: PHYSICIAN ASSISTANT

## 2024-02-01 PROCEDURE — 76705 ECHO EXAM OF ABDOMEN: CPT | Mod: TC

## 2024-02-01 PROCEDURE — 3044F HG A1C LEVEL LT 7.0%: CPT | Mod: CPTII,S$GLB,, | Performed by: PHYSICIAN ASSISTANT

## 2024-02-01 PROCEDURE — 1160F RVW MEDS BY RX/DR IN RCRD: CPT | Mod: CPTII,S$GLB,, | Performed by: PHYSICIAN ASSISTANT

## 2024-02-01 PROCEDURE — 3008F BODY MASS INDEX DOCD: CPT | Mod: CPTII,S$GLB,, | Performed by: PHYSICIAN ASSISTANT

## 2024-02-01 PROCEDURE — 99214 OFFICE O/P EST MOD 30 MIN: CPT | Mod: S$GLB,,, | Performed by: PHYSICIAN ASSISTANT

## 2024-02-01 PROCEDURE — 76981 USE PARENCHYMA: CPT | Mod: S$GLB,,, | Performed by: PHYSICIAN ASSISTANT

## 2024-02-01 PROCEDURE — 76705 ECHO EXAM OF ABDOMEN: CPT | Mod: 26,,, | Performed by: RADIOLOGY

## 2024-02-01 PROCEDURE — 1159F MED LIST DOCD IN RCRD: CPT | Mod: CPTII,S$GLB,, | Performed by: PHYSICIAN ASSISTANT

## 2024-02-01 RX ORDER — LEDIPASVIR AND SOFOSBUVIR 90; 400 MG/1; MG/1
1 TABLET, FILM COATED ORAL DAILY
Qty: 28 TABLET | Refills: 1 | Status: ACTIVE | OUTPATIENT
Start: 2024-02-01

## 2024-02-01 RX ORDER — FAMOTIDINE 40 MG/1
40 TABLET, FILM COATED ORAL DAILY
Qty: 30 TABLET | Refills: 2 | Status: SHIPPED | OUTPATIENT
Start: 2024-02-01 | End: 2025-01-31

## 2024-02-01 RX ORDER — VALACYCLOVIR HYDROCHLORIDE 1 G/1
1000 TABLET, FILM COATED ORAL 2 TIMES DAILY
Qty: 20 TABLET | Refills: 0 | Status: SHIPPED | OUTPATIENT
Start: 2024-02-01 | End: 2024-02-06

## 2024-02-01 NOTE — PROCEDURES
FibroScan (Vibration Controlled Transient Elastography)    Date/Time: 2/1/2024 9:15 AM    Performed by: Scheuermann, Jennifer B., PA  Authorized by: Scheuermann, Jennifer B., PA    Diagnosis:  HCV    Probe:  M    Universal Protocol: Patient's identity, procedure and site were verified, confirmatory pause was performed.  Discussed procedure including risks and potential complications.  Questions answered.  Patient verbalizes understanding and wishes to proceed with VCTE.     Procedure: After providing explanations of the procedure, patient was placed in the supine position with right arm in maximum abduction to allow optimal exposure of right lateral abdomen.  Patient was briefly assessed, Testing was performed in the mid-axillary location, 50Hz Shear Wave pulses were applied and the resulting Shear Wave and Propagation Speed detected with a 3.5 MHz ultrasonic signal, using the FibroScan probe, Skin to liver capsule distance and liver parenchyma were accessed during the entire examination with the FibroScan probe, Patient was instructed to breathe normally and to abstain from sudden movements during the procedure, allowing for random measurements of liver stiffness. At least 10 Shear Waves were produced, Individual measurements of each Shear Wave were calculated.  Patient tolerated the procedure well with no complications.  Meets discharge criteria as was dismissed.  Rates pain 0 out of 10.  Patient will follow up with ordering provider to review results.    Findings  Median liver stiffness score:  3.4  CAP Reading: dB/m:  202    IQR/med %:  15  Interpretation  Fibrosis interpretation is based on medial liver stiffness - Kilopascal (kPa).    Fibrosis Stage:  F 0-1  Steatosis interpretation is based on controlled attenuation parameter - (dB/m).    Steatosis Grade:  <S1

## 2024-02-01 NOTE — PROGRESS NOTES
HEPATOLOGY CLINIC VISIT NOTE - HCV clinic  CHIEF COMPLAINT: Hepatitis C     HISTORY       This is a 28 y.o. White male with chronic hepatitis C (F0-1 in ) & GB polyps on U/S, last seen by me in , subsequently lost to f/u.     Interval history:  Harvoni x 8wks prescribed at last visit (): received 1st shipment, never took it  Lost insurance the next day; brother  of fentanyl OD shortly thereafter. 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 In interim his daughter's mother left so he now has full custody of 3 y/o daughter.     Motivated now to have HCV treated. Has met someone new but worried about sexual HCV transmission to her    Had 1 dose (of 2-dose series) HBV vaccine 2021  (Of note, had full vaccination as infant)    Recent labs w/ mild ALT elevation. O/w normal liver panel and plt > 300    HCV history:  Reports (-) HCV screening 3/2018 (screening done at his request after being released from [facility] 2017; he saw report w/ neg HCV results)   Screened (+) for Hepatitis C late  when he requested STD screening (he believes this was HCVAB only, RNA not done)  Prior icteric illnesses: None  Risks for HCV:  Tattoos, cut by scalpel (works in [employer]), prior incarceration    Mom has HCV acquired through drug use after pt's birth  - Treatment naive   - Genotype 1a  - NS5A resistance not known      Liver staging:  FibroScan 2021 - kPa 5.1, F0-1 (, no signif steatosis)  Labs / imaging support FibroScan:      PMH, PSH, SOCIAL HX, FAMILY HX       Reviewed in Epic  Pertinent findings:  FAMILY HX: mom w/ HCV  SOCIAL HX: Not , now has full custody of 3 y/o daughter. Resides in [facility]  Working in [employer] business (Family business w/ his father))  Alcohol -   Initial visit: infrequent  Today's visit: during past 2 yrs drank several drinks twice per week. Currently having 1-2 daiquiris per  week  Drugs - denies  Spiritual - yes, Christianity      ROS: as per HPI  (+) GERD - some relief w/ daughter's nexium but recently ran out    PHYSICAL EXAM:  Friendly White male, in no acute distress; alert and oriented to person, place and time  HEENT: Sclerae anicteric.   LUNGS: Normal respiratory effort.   ABDOMEN: Flat, soft  SKIN: Warm and dry. No jaundice, several tattoos  EXTREMITIES: No lower extremity edema  NEURO/PSYCH: Normal gate. Memory intact. Thought and speech pattern appropriate. Behavior normal. No depression or anxiety noted.    DIAGNOSTIC RESULTS      Lab Results   Component Value Date     01/30/2024    K 4.1 01/30/2024    BUN 9 01/30/2024    CREATININE 0.9 01/30/2024    ALBUMIN 4.4 01/30/2024    ALKPHOS 65 01/30/2024    BILITOT 0.4 01/30/2024    AST 25 01/30/2024    ALT 54 (H) 01/30/2024     Lab Results   Component Value Date    WBC 8.96 01/30/2024    HGB 15.2 01/30/2024     01/30/2024     Results for orders placed during the hospital encounter of 06/30/21    US Abdomen Complete  CLINICAL DATA: Hepatitis C    FINDINGS: Sonographic assessment of the abdomen demonstrates a normal appearance of the pancreatic head. The body and tail are of scattered by bowel gas. Visualized portions of the aorta and inferior vena cava are normal.    The liver has a normal appearance. Hepatopedal flow is noted within the portal vein. Small gallbladder polyps (2 to 3 mm) are noted, with no gallstones and no evidence of cholecystitis or obstruction. The common bile duct measures 3 mm.    The spleen is normal in size and appearance. The kidneys are unremarkable. There is no free fluid.    IMPRESSION:  1. No acute intra-abdominal abnormalities are identified.  2. Small gallbladder polyps.      ASSESSMENT      28 y.o. White male with:  1. CHRONIC HEPATITIS C, GENOTYPE 1a - treatment naive  -- FibroScan - F0-1 (2021)  -- Elevated transaminases  -- (+) Immunity to HAV   -- lacking immunity to HBV 2021 -> s/p  vaccine per above    2. GB POLYPS    PLAN      Labs & FibroScan today  U/S abdomen soon  Orders Placed This Encounter   Procedures    FibroScan (Vibration Controlled Transient Elastography)    US Abdomen Limited    Hepatitis C RNA, Quantitative, PCR    Hepatitis B Surface Antigen    Hepatitis B Surface Ab, Qualitative    Hepatitis B Core Antibody, Total   Harvoni x 8 weeks (as long as RNA < 6 million; 12 weeks If > 6 million)  Rx routed to [facility]  Patient will notify me of start date so lab f/u can be scheduled.  Pepcid 40mg daily,dosed per below, for acid symptoms while on Harvoni.   After HCV Rx will need to see PCP / GI if continued acid symptoms  F/U visit on treatment    _______________________________________________________________________________  EDUCATION:  HCV RX  Discussed goal of HCV eradication to prevent progression of liver disease.  Discussed use of Harvoni daily x 8 weeks w/ potential side effects of fatigue and headache.     Reviewed limitations on acid suppressant medications due to DDI w/ Harvoni:  -- Antacids - TUMS must be  from Harvoni by 4 hours  -- H2 Receptor Antagonist - Pepcid 40mg Must be dosed once daily at same time as Harvoni.   -- PPI - Must be dosed at same time as Harvoni on empty stomach. Can eat 30-45 min later  Patient instructed to contact me if experiencing acid related symptoms so further recommendations can be made regarding acid suppression therapy.      Herbal / alternative therapies must be discontinued  Discussed importance of medication adherence and risk of treatment failure / viral resistance if not adherent. Pt has verbalized understanding.    __________________________________________________________________    Duration of encounter: 38 min  This includes face-to-face time and non face-to-face time preparing to see the patient (eg, review of tests), obtaining and/or reviewing separately obtained history, documenting clinical information  in the electronic or other health record, independently interpreting resultsand communicating results to the patient/family/caregiver, or care coordination.

## 2024-02-02 LAB
HCV RNA SERPL NAA+PROBE-LOG IU: 5.42 LOGIU/ML
HCV RNA SERPL QL NAA+PROBE: DETECTED
HCV RNA SPEC NAA+PROBE-ACNC: ABNORMAL IU/ML

## 2024-02-06 ENCOUNTER — TELEPHONE (OUTPATIENT)
Dept: HEPATOLOGY | Facility: CLINIC | Age: 29
End: 2024-02-06
Payer: COMMERCIAL

## 2024-02-06 NOTE — TELEPHONE ENCOUNTER
Pt beginning 8 weeks Claribel on 2/7/24  Anticipated treatment end date: 4/2/24  F 0-1  [place] 1A  Prior HCV treatment: No      Pls update episode and schedule:  - LFT, HCV RNA at week 4  - Visit w/ me couple weeks after labs (however it works best for his schedule / location)  - LFT, HCV RNA - SVR12 - 7/2/24

## 2024-02-07 ENCOUNTER — EPISODE CHANGES (OUTPATIENT)
Dept: HEPATOLOGY | Facility: CLINIC | Age: 29
End: 2024-02-07

## 2024-02-07 DIAGNOSIS — B18.2 CHRONIC HEPATITIS C WITHOUT HEPATIC COMA: Primary | ICD-10-CM

## 2024-02-07 NOTE — TELEPHONE ENCOUNTER
I spoke with patient.  Msg from PA Scheuermann relayed and mailed to him.  Labs scheduled 3/5/24 and 7/2/24.  F/u visit scheduled 3/15/24; appt reminder notice mailed.

## 2024-02-14 ENCOUNTER — OFFICE VISIT (OUTPATIENT)
Dept: FAMILY MEDICINE | Facility: CLINIC | Age: 29
End: 2024-02-14
Payer: COMMERCIAL

## 2024-02-14 ENCOUNTER — LAB VISIT (OUTPATIENT)
Dept: LAB | Facility: HOSPITAL | Age: 29
End: 2024-02-14
Attending: STUDENT IN AN ORGANIZED HEALTH CARE EDUCATION/TRAINING PROGRAM
Payer: COMMERCIAL

## 2024-02-14 VITALS
SYSTOLIC BLOOD PRESSURE: 108 MMHG | HEIGHT: 66 IN | HEART RATE: 66 BPM | DIASTOLIC BLOOD PRESSURE: 72 MMHG | BODY MASS INDEX: 21.62 KG/M2 | OXYGEN SATURATION: 97 % | TEMPERATURE: 98 F | WEIGHT: 134.5 LBS

## 2024-02-14 DIAGNOSIS — A63.0 GENITAL WARTS: Primary | ICD-10-CM

## 2024-02-14 DIAGNOSIS — B18.2 CRYOGLOBULINEMIA DUE TO CHRONIC HEPATITIS C: ICD-10-CM

## 2024-02-14 DIAGNOSIS — D89.1 CRYOGLOBULINEMIA DUE TO CHRONIC HEPATITIS C: ICD-10-CM

## 2024-02-14 DIAGNOSIS — B18.2 CHRONIC HEPATITIS C WITHOUT HEPATIC COMA: ICD-10-CM

## 2024-02-14 LAB
ALBUMIN SERPL BCP-MCNC: 4.9 G/DL (ref 3.5–5.2)
ALP SERPL-CCNC: 66 U/L (ref 55–135)
ALT SERPL W/O P-5'-P-CCNC: 32 U/L (ref 10–44)
AST SERPL-CCNC: 19 U/L (ref 10–40)
BILIRUB DIRECT SERPL-MCNC: 0.2 MG/DL (ref 0.1–0.3)
BILIRUB SERPL-MCNC: 0.5 MG/DL (ref 0.1–1)
IGA SERPL-MCNC: 141 MG/DL (ref 40–350)
IGG SERPL-MCNC: 1413 MG/DL (ref 650–1600)
IGM SERPL-MCNC: 168 MG/DL (ref 50–300)
PROT SERPL-MCNC: 8.6 G/DL (ref 6–8.4)

## 2024-02-14 PROCEDURE — 99999 PR PBB SHADOW E&M-EST. PATIENT-LVL IV: CPT | Mod: PBBFAC,,, | Performed by: STUDENT IN AN ORGANIZED HEALTH CARE EDUCATION/TRAINING PROGRAM

## 2024-02-14 PROCEDURE — 3008F BODY MASS INDEX DOCD: CPT | Mod: CPTII,S$GLB,, | Performed by: STUDENT IN AN ORGANIZED HEALTH CARE EDUCATION/TRAINING PROGRAM

## 2024-02-14 PROCEDURE — 82595 ASSAY OF CRYOGLOBULIN: CPT | Performed by: STUDENT IN AN ORGANIZED HEALTH CARE EDUCATION/TRAINING PROGRAM

## 2024-02-14 PROCEDURE — 1160F RVW MEDS BY RX/DR IN RCRD: CPT | Mod: CPTII,S$GLB,, | Performed by: STUDENT IN AN ORGANIZED HEALTH CARE EDUCATION/TRAINING PROGRAM

## 2024-02-14 PROCEDURE — 99213 OFFICE O/P EST LOW 20 MIN: CPT | Mod: S$GLB,,, | Performed by: STUDENT IN AN ORGANIZED HEALTH CARE EDUCATION/TRAINING PROGRAM

## 2024-02-14 PROCEDURE — 1159F MED LIST DOCD IN RCRD: CPT | Mod: CPTII,S$GLB,, | Performed by: STUDENT IN AN ORGANIZED HEALTH CARE EDUCATION/TRAINING PROGRAM

## 2024-02-14 PROCEDURE — 3078F DIAST BP <80 MM HG: CPT | Mod: CPTII,S$GLB,, | Performed by: STUDENT IN AN ORGANIZED HEALTH CARE EDUCATION/TRAINING PROGRAM

## 2024-02-14 PROCEDURE — 82784 ASSAY IGA/IGD/IGG/IGM EACH: CPT | Mod: 59 | Performed by: STUDENT IN AN ORGANIZED HEALTH CARE EDUCATION/TRAINING PROGRAM

## 2024-02-14 PROCEDURE — 80076 HEPATIC FUNCTION PANEL: CPT | Performed by: STUDENT IN AN ORGANIZED HEALTH CARE EDUCATION/TRAINING PROGRAM

## 2024-02-14 PROCEDURE — 3044F HG A1C LEVEL LT 7.0%: CPT | Mod: CPTII,S$GLB,, | Performed by: STUDENT IN AN ORGANIZED HEALTH CARE EDUCATION/TRAINING PROGRAM

## 2024-02-14 PROCEDURE — 36415 COLL VENOUS BLD VENIPUNCTURE: CPT | Mod: PO | Performed by: STUDENT IN AN ORGANIZED HEALTH CARE EDUCATION/TRAINING PROGRAM

## 2024-02-14 PROCEDURE — 3074F SYST BP LT 130 MM HG: CPT | Mod: CPTII,S$GLB,, | Performed by: STUDENT IN AN ORGANIZED HEALTH CARE EDUCATION/TRAINING PROGRAM

## 2024-02-14 RX ORDER — IMIQUIMOD 12.5 MG/.25G
CREAM TOPICAL
Qty: 12 PACKET | Refills: 3 | Status: SHIPPED | OUTPATIENT
Start: 2024-02-14

## 2024-02-14 RX ORDER — PREDNISONE 10 MG/1
TABLET ORAL
Qty: 18 TABLET | Refills: 0 | Status: SHIPPED | OUTPATIENT
Start: 2024-02-14

## 2024-02-14 RX ORDER — VALACYCLOVIR HYDROCHLORIDE 500 MG/1
500 TABLET, FILM COATED ORAL 2 TIMES DAILY PRN
COMMUNITY

## 2024-02-14 NOTE — PROGRESS NOTES
SUBJECTIVE:    CHIEF COMPLAINT:   Chief Complaint   Patient presents with    Rash     Genital wart           274}    HISTORY OF PRESENT ILLNESS:  David Knutson is a 28 y.o. male who presents to the clinic today for evaluation of rash on upper torso as well as genital wart.  He reports that he has been restarted on Harvoni therapy for hep C for the past 7 days and he is noticed a rash develop his posterior back as well as abdomen for the last 2 days.     He reports genital wart present for at least 1 year.  He has not sought any other treatment until today.    PAST MEDICAL HISTORY:     274}  Past Medical History:   Diagnosis Date    ADHD (attention deficit hyperactivity disorder)     Chronic hepatitis C     prescribed harvoni. took only 1 month starting 8/27/21 and then lost to f/u. did  NOT complete full course.    Seizures        PAST SURGICAL HISTORY:  Past Surgical History:   Procedure Laterality Date    ADENOIDECTOMY      APPENDECTOMY         SOCIAL HISTORY:  Social History     Socioeconomic History    Marital status: Single   Tobacco Use    Smoking status: Former     Types: Vaping with nicotine     Start date: 7/26/2021    Smokeless tobacco: Never   Substance and Sexual Activity    Alcohol use: Yes    Drug use: No    Sexual activity: Not Currently       FAMILY HISTORY:       Family History   Problem Relation Age of Onset    Drug abuse Mother     Lupus Neg Hx     Melanoma Neg Hx     Psoriasis Neg Hx        ALLERGIES AND MEDICATIONS: updated and reviewed.      274}  Review of patient's allergies indicates:  No Known Allergies  Medication List with Changes/Refills   New Medications    IMIQUIMOD (ALDARA) 5 % CREAM    Apply topically 3 (three) times a week. leave on skin for 6-10 hours. Remove by washing with mild soap and water.    PREDNISONE (DELTASONE) 10 MG TABLET    3 pills daily for 3 days then 2 pills daily for 3 days and then 1 pill daily for 3 day   Current Medications    ALBUTEROL (PROAIR HFA) 90  "MCG/ACTUATION INHALER    Inhale 2 puffs into the lungs every 4 (four) hours as needed for Wheezing. Rescue    CLONAZEPAM (KLONOPIN) 1 MG TABLET    Take 1 mg by mouth.    DEXTROAMPHETAMINE-AMPHETAMINE (ADDERALL) 20 MG TABLET    Take 1 tablet by mouth 2 (two) times daily.    ESCITALOPRAM OXALATE (LEXAPRO) 20 MG TABLET    Take 20 mg by mouth once daily.    FAMOTIDINE (PEPCID) 40 MG TABLET    Take 1 tablet (40 mg total) by mouth once daily. Take at same time as harvoni    HARVONI  MG ORAL TABLET    Take 1 tablet by mouth once daily.    IBUPROFEN (ADVIL,MOTRIN) 600 MG TABLET    Take 1 tablet (600 mg total) by mouth every 8 (eight) hours as needed for Pain.    VALACYCLOVIR (VALTREX) 500 MG TABLET    Take 500 mg by mouth 2 (two) times daily as needed.       SCREENING HISTORY:    274}  Health Maintenance         Date Due Completion Date    COVID-19 Vaccine (1) Never done ---    Pneumococcal Vaccines (Age 0-64) (1 of 2 - PCV) Never done ---    Influenza Vaccine (1) 09/01/2023 11/1/2012    TETANUS VACCINE 03/13/2030 3/13/2020            REVIEW OF SYSTEMS:   Review of Systems   All other systems reviewed and are negative.      PHYSICAL EXAM:      274}  /72 (BP Location: Right arm, Patient Position: Sitting, BP Method: Small (Manual))   Pulse 66   Temp 97.9 °F (36.6 °C)   Ht 5' 6" (1.676 m)   Wt 61 kg (134 lb 7.7 oz)   SpO2 97%   BMI 21.71 kg/m²   Wt Readings from Last 3 Encounters:   02/14/24 61 kg (134 lb 7.7 oz)   02/01/24 58.3 kg (128 lb 8.5 oz)   01/30/24 60.9 kg (134 lb 4.2 oz)     BP Readings from Last 3 Encounters:   02/14/24 108/72   01/30/24 112/68   10/28/23 111/68     Estimated body mass index is 21.71 kg/m² as calculated from the following:    Height as of this encounter: 5' 6" (1.676 m).    Weight as of this encounter: 61 kg (134 lb 7.7 oz).     Physical Exam  Vitals reviewed.   Constitutional:       General: He is not in acute distress.     Appearance: Normal appearance. He is well-developed and " normal weight. He is not ill-appearing.   HENT:      Head: Normocephalic and atraumatic.      Right Ear: External ear normal.      Left Ear: External ear normal.      Nose: Nose normal.   Eyes:      Extraocular Movements: Extraocular movements intact.      Conjunctiva/sclera: Conjunctivae normal.      Pupils: Pupils are equal, round, and reactive to light.   Neck:      Thyroid: No thyroid mass.   Cardiovascular:      Rate and Rhythm: Normal rate and regular rhythm.      Pulses: Normal pulses.      Heart sounds: Normal heart sounds, S1 normal and S2 normal. No murmur heard.     No gallop.   Pulmonary:      Effort: Pulmonary effort is normal. No respiratory distress.      Breath sounds: Normal breath sounds and air entry. No stridor. No wheezing, rhonchi or rales.   Abdominal:      General: Bowel sounds are normal. There is no distension.      Palpations: Abdomen is soft. There is no mass.      Tenderness: There is no abdominal tenderness.   Musculoskeletal:         General: No swelling or deformity. Normal range of motion.      Cervical back: Normal range of motion and neck supple. No edema or erythema.   Skin:     General: Skin is warm and dry.      Findings: No lesion or rash.   Neurological:      General: No focal deficit present.      Mental Status: He is alert and oriented to person, place, and time. Mental status is at baseline.   Psychiatric:         Mood and Affect: Mood normal.         Behavior: Behavior normal. Behavior is cooperative.         Judgment: Judgment normal.         LABS:   274}  I have reviewed old labs below:  Lab Results   Component Value Date    WBC 8.96 01/30/2024    HGB 15.2 01/30/2024    HCT 45.8 01/30/2024    MCV 92 01/30/2024     01/30/2024     01/30/2024    K 4.1 01/30/2024     01/30/2024    CALCIUM 10.0 01/30/2024    CO2 26 01/30/2024    GLU 81 01/30/2024    BUN 9 01/30/2024    CREATININE 0.9 01/30/2024    ANIONGAP 10 01/30/2024    ESTGFRAFRICA >60 06/14/2021     EGFRNONAA >60 06/14/2021    PROT 7.6 01/30/2024    ALBUMIN 4.4 01/30/2024    BILITOT 0.4 01/30/2024    ALKPHOS 65 01/30/2024    ALT 54 (H) 01/30/2024    AST 25 01/30/2024    CHOL 162 01/30/2024    TRIG 61 01/30/2024    HDL 44 01/30/2024    LDLCALC 105.8 01/30/2024    HGBA1C 5.3 01/30/2024       ASSESSMENT AND PLAN:  274}  1. Genital warts  -     imiquimod (ALDARA) 5 % cream; Apply topically 3 (three) times a week. leave on skin for 6-10 hours. Remove by washing with mild soap and water.  Dispense: 12 packet; Refill: 3    2. 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  Trial of steroids, recommend follow-up with hepatology if nonresolving  Orders:  -     IMMUNOGLOBULINS (IGG, IGA, IGM) QUANTITATIVE; Future; Expected date: 02/14/2024  -     predniSONE (DELTASONE) 10 MG tablet; 3 pills daily for 3 days then 2 pills daily for 3 days and then 1 pill daily for 3 day  Dispense: 18 tablet; Refill: 0  -     CRYOGLOBULIN; Future; Expected date: 02/14/2024    3. Chronic hepatitis C without hepatic coma  Comments:  Now back on Harvoni for the past 7 days, with the occurrence of new rash.  Overview:  prescribed harvoni. took only 1 month starting 8/27/21 and then lost to f/u. did  NOT complete full course.    Orders:  -     HEPATIC FUNCTION PANEL; Future; Expected date: 02/14/2024           Orders Placed This Encounter   Procedures    IMMUNOGLOBULINS (IGG, IGA, IGM) QUANTITATIVE    CRYOGLOBULIN    HEPATIC FUNCTION PANEL       Follow up if symptoms worsen or fail to improve. or sooner as needed.

## 2024-02-15 ENCOUNTER — OFFICE VISIT (OUTPATIENT)
Dept: FAMILY MEDICINE | Facility: CLINIC | Age: 29
End: 2024-02-15
Payer: COMMERCIAL

## 2024-02-15 ENCOUNTER — PATIENT MESSAGE (OUTPATIENT)
Dept: HEPATOLOGY | Facility: CLINIC | Age: 29
End: 2024-02-15
Payer: COMMERCIAL

## 2024-02-15 ENCOUNTER — PATIENT MESSAGE (OUTPATIENT)
Dept: FAMILY MEDICINE | Facility: CLINIC | Age: 29
End: 2024-02-15

## 2024-02-15 VITALS
DIASTOLIC BLOOD PRESSURE: 84 MMHG | RESPIRATION RATE: 18 BRPM | SYSTOLIC BLOOD PRESSURE: 132 MMHG | TEMPERATURE: 99 F | HEIGHT: 66 IN | WEIGHT: 134.5 LBS | BODY MASS INDEX: 21.62 KG/M2 | OXYGEN SATURATION: 98 % | HEART RATE: 73 BPM

## 2024-02-15 DIAGNOSIS — A63.0 GENITAL WARTS: ICD-10-CM

## 2024-02-15 DIAGNOSIS — A74.9 CHLAMYDIA INFECTION: ICD-10-CM

## 2024-02-15 DIAGNOSIS — B18.2 CHRONIC HEPATITIS C WITHOUT HEPATIC COMA: ICD-10-CM

## 2024-02-15 DIAGNOSIS — J02.9 SORE THROAT: Primary | ICD-10-CM

## 2024-02-15 LAB
CTP QC/QA: YES
CTP QC/QA: YES
POC MOLECULAR INFLUENZA A AGN: NEGATIVE
POC MOLECULAR INFLUENZA B AGN: NEGATIVE
SARS-COV-2 RDRP RESP QL NAA+PROBE: NEGATIVE

## 2024-02-15 PROCEDURE — 87635 SARS-COV-2 COVID-19 AMP PRB: CPT | Mod: QW,S$GLB,, | Performed by: STUDENT IN AN ORGANIZED HEALTH CARE EDUCATION/TRAINING PROGRAM

## 2024-02-15 PROCEDURE — 3079F DIAST BP 80-89 MM HG: CPT | Mod: CPTII,S$GLB,, | Performed by: STUDENT IN AN ORGANIZED HEALTH CARE EDUCATION/TRAINING PROGRAM

## 2024-02-15 PROCEDURE — 3075F SYST BP GE 130 - 139MM HG: CPT | Mod: CPTII,S$GLB,, | Performed by: STUDENT IN AN ORGANIZED HEALTH CARE EDUCATION/TRAINING PROGRAM

## 2024-02-15 PROCEDURE — 99213 OFFICE O/P EST LOW 20 MIN: CPT | Mod: S$GLB,,, | Performed by: STUDENT IN AN ORGANIZED HEALTH CARE EDUCATION/TRAINING PROGRAM

## 2024-02-15 PROCEDURE — 3008F BODY MASS INDEX DOCD: CPT | Mod: CPTII,S$GLB,, | Performed by: STUDENT IN AN ORGANIZED HEALTH CARE EDUCATION/TRAINING PROGRAM

## 2024-02-15 PROCEDURE — 1159F MED LIST DOCD IN RCRD: CPT | Mod: CPTII,S$GLB,, | Performed by: STUDENT IN AN ORGANIZED HEALTH CARE EDUCATION/TRAINING PROGRAM

## 2024-02-15 PROCEDURE — 99999 PR PBB SHADOW E&M-EST. PATIENT-LVL V: CPT | Mod: PBBFAC,,, | Performed by: STUDENT IN AN ORGANIZED HEALTH CARE EDUCATION/TRAINING PROGRAM

## 2024-02-15 PROCEDURE — 3044F HG A1C LEVEL LT 7.0%: CPT | Mod: CPTII,S$GLB,, | Performed by: STUDENT IN AN ORGANIZED HEALTH CARE EDUCATION/TRAINING PROGRAM

## 2024-02-15 PROCEDURE — 87502 INFLUENZA DNA AMP PROBE: CPT | Mod: QW,S$GLB,, | Performed by: STUDENT IN AN ORGANIZED HEALTH CARE EDUCATION/TRAINING PROGRAM

## 2024-02-15 RX ORDER — PHENOL 1.4 %
AEROSOL, SPRAY (ML) MUCOUS MEMBRANE 4 TIMES DAILY PRN
Qty: 177 ML | Refills: 0 | Status: SHIPPED | OUTPATIENT
Start: 2024-02-15

## 2024-02-15 RX ORDER — AMOXICILLIN 500 MG/1
500 TABLET, FILM COATED ORAL EVERY 12 HOURS
Qty: 20 TABLET | Refills: 0 | Status: SHIPPED | OUTPATIENT
Start: 2024-02-15 | End: 2024-02-25

## 2024-02-15 NOTE — PROGRESS NOTES
"[facility]   OFFICE VISIT NOTE    Patient Name: David Knutson  YOB: 1995    PRESENTING HISTORY     History of Present Illness:  Mr. David Knutson is a 28 y.o. male who is undergoing active treatment of hepatitis-C coming in for sore throat and sinus drip.    Denies any fever.    His sore throat started earlier this morning and became severely worse.  Reports having some dysphagia and odynophagia without any fever, chills.  He is coughing some yellow mucus as well.  He was just seen yesterday for warts on his back and genital warts and he was prescribed imiquimod and prednisone which he is compliant with.      Review of Systems   Constitutional:  Negative for chills and fever.   HENT:  Positive for postnasal drip, sore throat and trouble swallowing.    Respiratory:  Negative for chest tightness and shortness of breath.    Cardiovascular:  Negative for chest pain.          OBJECTIVE:   Vital Signs:  Vitals:    02/15/24 1532   BP: 132/84   Pulse: 73   Resp: 18   Temp: 99 °F (37.2 °C)   TempSrc: Oral   SpO2: 98%   Weight: 61 kg (134 lb 7.7 oz)   Height: 5' 6" (1.676 m)           Physical Exam  Constitutional:       General: He is not in acute distress.     Appearance: He is not ill-appearing or toxic-appearing.   HENT:      Head: Normocephalic and atraumatic.      Right Ear: Tympanic membrane, ear canal and external ear normal.      Left Ear: Tympanic membrane, ear canal and external ear normal.      Mouth/Throat:      Mouth: Mucous membranes are moist.      Pharynx: Uvula midline. No pharyngeal swelling, oropharyngeal exudate or posterior oropharyngeal erythema.      Tonsils: No tonsillar exudate or tonsillar abscesses.   Eyes:      Extraocular Movements: Extraocular movements intact.      Pupils: Pupils are equal, round, and reactive to light.   Cardiovascular:      Rate and Rhythm: Normal rate and regular rhythm.   Pulmonary:      Effort: Pulmonary effort is normal. No " tachypnea, bradypnea, accessory muscle usage, prolonged expiration or respiratory distress.      Breath sounds: Normal breath sounds. No stridor. No wheezing, rhonchi or rales.   Musculoskeletal:      Cervical back: Normal range of motion. No tenderness.   Lymphadenopathy:      Cervical: Cervical adenopathy present.   Neurological:      General: No focal deficit present.      Mental Status: He is alert.   Psychiatric:         Mood and Affect: Mood normal.         Behavior: Behavior normal.         ASSESSMENT & PLAN:     Sore throat  -     POCT Influenza A/B Molecular  -     POCT COVID-19 Rapid Screening  -     phenoL (CHLORASEPTIC THROAT SPRAY) 1.4 % SprA; by Mucous Membrane route 4 (four) times daily as needed (sore throat).  Dispense: 177 mL; Refill: 0  -     amoxicillin (AMOXIL) 500 MG Tab; Take 1 tablet (500 mg total) by mouth every 12 (twelve) hours. for 10 days  Dispense: 20 tablet; Refill: 0  Flu and covid negative  Likely due to common cold vs acute pharyngitis   Chloraseptic spray and salt garling 3-4 times daily  Also will prescribe a short course of amoxicillin to help with pharyngitis  ED precautions given for any shortness of breath/chest discomfort, fever not broken with tylenol/advil, malaise     Genital warts  Continue with imiquimod cream     Chlamydia infection  Finished the antibiotics course    Chronic hepatitis C without hepatic coma  Continue with Claribel and continue to follow up with hepatology          Larissa Ocampo MD  Family Medicine  [facility]     This note was created using [employer] voice recognition software that occasionally misinterprets phrases or words

## 2024-02-26 LAB — CRYOGLOB SER QL: NORMAL

## 2024-02-29 ENCOUNTER — OFFICE VISIT (OUTPATIENT)
Dept: DERMATOLOGY | Facility: CLINIC | Age: 29
End: 2024-02-29
Payer: COMMERCIAL

## 2024-02-29 VITALS — BODY MASS INDEX: 21.62 KG/M2 | WEIGHT: 134.5 LBS | HEIGHT: 66 IN

## 2024-02-29 DIAGNOSIS — B36.0 TINEA VERSICOLOR: Primary | ICD-10-CM

## 2024-02-29 DIAGNOSIS — A63.0 CONDYLOMA ACUMINATA: ICD-10-CM

## 2024-02-29 PROCEDURE — 1159F MED LIST DOCD IN RCRD: CPT | Mod: CPTII,S$GLB,, | Performed by: STUDENT IN AN ORGANIZED HEALTH CARE EDUCATION/TRAINING PROGRAM

## 2024-02-29 PROCEDURE — 3008F BODY MASS INDEX DOCD: CPT | Mod: CPTII,S$GLB,, | Performed by: STUDENT IN AN ORGANIZED HEALTH CARE EDUCATION/TRAINING PROGRAM

## 2024-02-29 PROCEDURE — 87220 TISSUE EXAM FOR FUNGI: CPT | Mod: S$GLB,,, | Performed by: STUDENT IN AN ORGANIZED HEALTH CARE EDUCATION/TRAINING PROGRAM

## 2024-02-29 PROCEDURE — 99213 OFFICE O/P EST LOW 20 MIN: CPT | Mod: 25,S$GLB,, | Performed by: STUDENT IN AN ORGANIZED HEALTH CARE EDUCATION/TRAINING PROGRAM

## 2024-02-29 PROCEDURE — 3044F HG A1C LEVEL LT 7.0%: CPT | Mod: CPTII,S$GLB,, | Performed by: STUDENT IN AN ORGANIZED HEALTH CARE EDUCATION/TRAINING PROGRAM

## 2024-02-29 PROCEDURE — 1160F RVW MEDS BY RX/DR IN RCRD: CPT | Mod: CPTII,S$GLB,, | Performed by: STUDENT IN AN ORGANIZED HEALTH CARE EDUCATION/TRAINING PROGRAM

## 2024-02-29 RX ORDER — KETOCONAZOLE 20 MG/G
CREAM TOPICAL 2 TIMES DAILY
Qty: 60 G | Refills: 2 | Status: SHIPPED | OUTPATIENT
Start: 2024-02-29

## 2024-02-29 RX ORDER — KETOCONAZOLE 20 MG/ML
SHAMPOO, SUSPENSION TOPICAL DAILY
Qty: 120 ML | Refills: 3 | Status: SHIPPED | OUTPATIENT
Start: 2024-02-29

## 2024-02-29 NOTE — PROGRESS NOTES
Subjective:      Patient ID:  David Knutson is a 28 y.o. male who presents for   Chief Complaint   Patient presents with    Rash    Warts     Last OV 8-  Dr Del Valle    Pt is here today for a rash that is all over back; chest off and on for years, using selsun blue without relief. Was given PO prednisone which he thinks worsened the rash.   Pt states it itches sometimes and burns when he becomes sweaty.  Also c/o genital warts for years, using Imiquimod without relief, started it about 2 weeks ago.   Did not have HPV vaccine.  Currently undergoing treatment for hep C w/ harvoni.      Current Outpatient Medications:   ·  albuterol (PROAIR HFA) 90 mcg/actuation inhaler, Inhale 2 puffs into the lungs every 4 (four) hours as needed for Wheezing. Rescue, Disp: 18 g, Rfl: 2  ·  clonazePAM (KLONOPIN) 1 MG tablet, Take 1 mg by mouth., Disp: , Rfl:   ·  dextroamphetamine-amphetamine (ADDERALL) 20 mg tablet, Take 1 tablet by mouth 2 (two) times daily., Disp: , Rfl:   ·  EScitalopram oxalate (LEXAPRO) 20 MG tablet, Take 20 mg by mouth once daily., Disp: , Rfl:   ·  famotidine (PEPCID) 40 MG tablet, Take 1 tablet (40 mg total) by mouth once daily. Take at same time as harvoni, Disp: 30 tablet, Rfl: 2  ·  HARVONI  mg oral tablet, Take 1 tablet by mouth once daily., Disp: 28 tablet, Rfl: 1  ·  ibuprofen (ADVIL,MOTRIN) 600 MG tablet, Take 1 tablet (600 mg total) by mouth every 8 (eight) hours as needed for Pain., Disp: 42 tablet, Rfl: 0  ·  imiquimod (ALDARA) 5 % cream, Apply topically 3 (three) times a week. leave on skin for 6-10 hours. Remove by washing with mild soap and water., Disp: 12 packet, Rfl: 3  ·  phenoL (CHLORASEPTIC THROAT SPRAY) 1.4 % SprA, by Mucous Membrane route 4 (four) times daily as needed (sore throat)., Disp: 177 mL, Rfl: 0  ·  valACYclovir (VALTREX) 500 MG tablet, Take 500 mg by mouth 2 (two) times daily as needed., Disp: , Rfl:   ·  predniSONE (DELTASONE) 10 MG tablet, 3 pills daily for 3  days then 2 pills daily for 3 days and then 1 pill daily for 3 day (Patient not taking: Reported on 2/29/2024), Disp: 18 tablet, Rfl: 0        Review of Systems   Constitutional:  Negative for fever and chills.   Respiratory:  Negative for cough and shortness of breath.    Skin:  Positive for itching and rash.       Objective:   Physical Exam   Constitutional: He appears well-developed and well-nourished. No distress.   Neurological: He is alert and oriented to person, place, and time. He is not disoriented.   Psychiatric: He has a normal mood and affect.   Skin:   Areas Examined (abnormalities noted in diagram):   Neck Inspection Performed  Chest / Axilla Inspection Performed  Abdomen Inspection Performed  Genitals / Buttocks / Groin Inspection Performed  Back Inspection Performed            Diagram Legend     Erythematous scaling macule/papule c/w actinic keratosis       Vascular papule c/w angioma      Pigmented verrucoid papule/plaque c/w seborrheic keratosis      Yellow umbilicated papule c/w sebaceous hyperplasia      Irregularly shaped tan macule c/w lentigo     1-2 mm smooth white papules consistent with Milia      Movable subcutaneous cyst with punctum c/w epidermal inclusion cyst      Subcutaneous movable cyst c/w pilar cyst      Firm pink to brown papule c/w dermatofibroma      Pedunculated fleshy papule(s) c/w skin tag(s)      Evenly pigmented macule c/w junctional nevus     Mildly variegated pigmented, slightly irregular-bordered macule c/w mildly atypical nevus      Flesh colored to evenly pigmented papule c/w intradermal nevus       Pink pearly papule/plaque c/w basal cell carcinoma      Erythematous hyperkeratotic cursted plaque c/w SCC      Surgical scar with no sign of skin cancer recurrence      Open and closed comedones      Inflammatory papules and pustules      Verrucoid papule consistent consistent with wart     Erythematous eczematous patches and plaques     Dystrophic onycholytic nail with  subungual debris c/w onychomycosis     Umbilicated papule    Erythematous-base heme-crusted tan verrucoid plaque consistent with inflamed seborrheic keratosis     Erythematous Silvery Scaling Plaque c/w Psoriasis     See annotation      Assessment / Plan:        Tinea versicolor  -     ketoconazole (NIZORAL) 2 % shampoo; Apply topically once daily.  Dispense: 120 mL; Refill: 3  -     ketoconazole (NIZORAL) 2 % cream; Apply topically 2 (two) times daily.  Dispense: 60 g; Refill: 2  Discussed PO diflucan however this interacts with several of his medications- he will use topicals for now    Condyloma acuminata  Cryosurgery procedure note:    Verbal consent from the patient is obtained. Liquid nitrogen cryosurgery is applied to 12 lesions to produce a freeze injury. The patient is aware that blisters may form and is instructed on wound care with gentle cleansing and use of vaseline ointment to keep moist until healed. The patient is supplied a handout on cryosurgery and is instructed to call if lesions do not completely resolve.  Discussed need for repeat treatment in 4-6 weeks  Recommend HPV vaccine series  Recommend imiquimod 3 nights weekly over night, start in next week             No follow-ups on file.

## 2024-03-05 ENCOUNTER — LAB VISIT (OUTPATIENT)
Dept: LAB | Facility: HOSPITAL | Age: 29
End: 2024-03-05
Attending: PHYSICIAN ASSISTANT
Payer: COMMERCIAL

## 2024-03-05 DIAGNOSIS — B18.2 CHRONIC HEPATITIS C WITHOUT HEPATIC COMA: ICD-10-CM

## 2024-03-05 LAB
ALBUMIN SERPL BCP-MCNC: 4.2 G/DL (ref 3.5–5.2)
ALP SERPL-CCNC: 61 U/L (ref 55–135)
ALT SERPL W/O P-5'-P-CCNC: 27 U/L (ref 10–44)
AST SERPL-CCNC: 16 U/L (ref 10–40)
BILIRUB DIRECT SERPL-MCNC: 0.2 MG/DL (ref 0.1–0.3)
BILIRUB SERPL-MCNC: 0.6 MG/DL (ref 0.1–1)
PROT SERPL-MCNC: 7.4 G/DL (ref 6–8.4)

## 2024-03-05 PROCEDURE — 36415 COLL VENOUS BLD VENIPUNCTURE: CPT | Mod: PO | Performed by: PHYSICIAN ASSISTANT

## 2024-03-05 PROCEDURE — 80076 HEPATIC FUNCTION PANEL: CPT | Performed by: PHYSICIAN ASSISTANT

## 2024-03-05 PROCEDURE — 87522 HEPATITIS C REVRS TRNSCRPJ: CPT | Performed by: PHYSICIAN ASSISTANT

## 2024-03-07 LAB
HCV RNA SERPL QL NAA+PROBE: DETECTED
HCV RNA SPEC NAA+PROBE-ACNC: <12 IU/ML

## 2024-03-08 ENCOUNTER — TELEPHONE (OUTPATIENT)
Dept: HEPATOLOGY | Facility: CLINIC | Age: 29
End: 2024-03-08
Payer: COMMERCIAL

## 2024-03-08 NOTE — TELEPHONE ENCOUNTER
----- Message from Griselda Yoder sent at 3/8/2024  9:20 AM CST -----  Regarding: R/S Appt  Contact: [phone]  TRACEY/KEON    Current Appt Date: 03/15/2024    Type of Appt: f/u    Physician: Reginaldo    Reason for Scheduling:  [place]    Caller:  ANKUSH LOPEZ [[id number]]     Contact Preference:  [phone]

## 2024-03-11 ENCOUNTER — OFFICE VISIT (OUTPATIENT)
Dept: CARDIOLOGY | Facility: CLINIC | Age: 29
End: 2024-03-11
Payer: COMMERCIAL

## 2024-03-11 VITALS
SYSTOLIC BLOOD PRESSURE: 98 MMHG | DIASTOLIC BLOOD PRESSURE: 65 MMHG | BODY MASS INDEX: 22.08 KG/M2 | WEIGHT: 137.38 LBS | HEART RATE: 56 BPM | HEIGHT: 66 IN

## 2024-03-11 DIAGNOSIS — Z72.0 TOBACCO USE: Chronic | ICD-10-CM

## 2024-03-11 DIAGNOSIS — R00.1 BRADYCARDIA: ICD-10-CM

## 2024-03-11 DIAGNOSIS — R07.2 PRECORDIAL PAIN: Primary | ICD-10-CM

## 2024-03-11 DIAGNOSIS — R01.1 SYSTOLIC MURMUR: ICD-10-CM

## 2024-03-11 DIAGNOSIS — B18.2 CHRONIC HEPATITIS C WITHOUT HEPATIC COMA: Chronic | ICD-10-CM

## 2024-03-11 PROCEDURE — 1159F MED LIST DOCD IN RCRD: CPT | Mod: CPTII,S$GLB,, | Performed by: INTERNAL MEDICINE

## 2024-03-11 PROCEDURE — 99999 PR PBB SHADOW E&M-EST. PATIENT-LVL IV: CPT | Mod: PBBFAC,,, | Performed by: INTERNAL MEDICINE

## 2024-03-11 PROCEDURE — 3044F HG A1C LEVEL LT 7.0%: CPT | Mod: CPTII,S$GLB,, | Performed by: INTERNAL MEDICINE

## 2024-03-11 PROCEDURE — 93005 ELECTROCARDIOGRAM TRACING: CPT | Mod: PO

## 2024-03-11 PROCEDURE — 3074F SYST BP LT 130 MM HG: CPT | Mod: CPTII,S$GLB,, | Performed by: INTERNAL MEDICINE

## 2024-03-11 PROCEDURE — 93010 ELECTROCARDIOGRAM REPORT: CPT | Mod: S$GLB,,, | Performed by: INTERNAL MEDICINE

## 2024-03-11 PROCEDURE — 99204 OFFICE O/P NEW MOD 45 MIN: CPT | Mod: 25,S$GLB,, | Performed by: INTERNAL MEDICINE

## 2024-03-11 PROCEDURE — 3078F DIAST BP <80 MM HG: CPT | Mod: CPTII,S$GLB,, | Performed by: INTERNAL MEDICINE

## 2024-03-11 PROCEDURE — 3008F BODY MASS INDEX DOCD: CPT | Mod: CPTII,S$GLB,, | Performed by: INTERNAL MEDICINE

## 2024-03-11 NOTE — PROGRESS NOTES
Subjective:    Patient ID:  David Knutson is a 28 y.o. 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 took only 1 month starting 8/27/21 and then lost to f/u. did  NOT complete full course.    Seizures      Past Surgical History:   Procedure Laterality Date    ADENOIDECTOMY      APPENDECTOMY       Family History   Problem Relation Age of Onset    Drug abuse Mother     Lupus Neg Hx     Melanoma Neg Hx     Psoriasis Neg Hx      Social History     Socioeconomic History    Marital status: Single   Tobacco Use    Smoking status: Former     Types: Vaping w/o nicotine    Smokeless tobacco: Never   Substance and Sexual Activity    Alcohol use: Yes    Drug use: Yes     Types: Marijuana    Sexual activity: Yes       Review of patient's allergies indicates:  No Known Allergies    Current Outpatient Medications:     albuterol (PROAIR HFA) 90 mcg/actuation inhaler, Inhale 2 puffs into the lungs every 4 (four) hours as needed for Wheezing. Rescue, Disp: 18 g, Rfl: 2    clonazePAM (KLONOPIN) 1 MG tablet, Take 1 mg by mouth., Disp: , Rfl:     dextroamphetamine-amphetamine (ADDERALL) 20 mg tablet, Take 1 tablet by mouth 2 (two) times daily., Disp: , Rfl:     EScitalopram oxalate (LEXAPRO) 20 MG tablet, Take 20 mg by mouth once daily., Disp: , Rfl:     famotidine (PEPCID) 40 MG tablet, Take 1 tablet (40 mg total) by mouth once daily. Take at same time as harvoni, Disp: 30 tablet, Rfl: 2    HARVONI  mg oral tablet, Take 1 tablet by mouth once daily., Disp: 28 tablet, Rfl:  1    ibuprofen (ADVIL,MOTRIN) 600 MG tablet, Take 1 tablet (600 mg total) by mouth every 8 (eight) hours as needed for Pain., Disp: 42 tablet, Rfl: 0    ketoconazole (NIZORAL) 2 % cream, Apply topically 2 (two) times daily., Disp: 60 g, Rfl: 2    ketoconazole (NIZORAL) 2 % shampoo, Apply topically once daily., Disp: 120 mL, Rfl: 3    valACYclovir (VALTREX) 500 MG tablet, Take 500 mg by mouth 2 (two) times daily as needed., Disp: , Rfl:     imiquimod (ALDARA) 5 % cream, Apply topically 3 (three) times a week. leave on skin for 6-10 hours. Remove by washing with mild soap and water. (Patient not taking: Reported on 3/11/2024), Disp: 12 packet, Rfl: 3    phenoL (CHLORASEPTIC THROAT SPRAY) 1.4 % SprA, by Mucous Membrane route 4 (four) times daily as needed (sore throat). (Patient not taking: Reported on 3/11/2024), Disp: 177 mL, Rfl: 0    predniSONE (DELTASONE) 10 MG tablet, 3 pills daily for 3 days then 2 pills daily for 3 days and then 1 pill daily for 3 day (Patient not taking: Reported on 2/29/2024), Disp: 18 tablet, Rfl: 0    Review of Systems   Constitutional: Negative for chills, diaphoresis, malaise/fatigue and night sweats.   HENT:  Negative for congestion, hearing loss and nosebleeds.    Eyes:  Negative for blurred vision and visual disturbance.   Cardiovascular:  Positive for chest pain. Negative for claudication, cyanosis, dyspnea on exertion, irregular heartbeat, leg swelling, near-syncope, orthopnea, palpitations, paroxysmal nocturnal dyspnea and syncope.   Respiratory:  Negative for cough, hemoptysis, shortness of breath and wheezing.    Endocrine: Negative for cold intolerance, heat intolerance and polyuria.   Hematologic/Lymphatic: Negative for adenopathy. Does not bruise/bleed easily.   Skin:  Negative for color change, itching and nail changes.   Musculoskeletal:  Negative for back pain, falls, joint pain and stiffness.   Gastrointestinal:  Negative for abdominal pain, change in bowel habit,  "constipation, dysphagia, heartburn, hematemesis, jaundice, melena and nausea.   Genitourinary:  Negative for dysuria and flank pain.   Neurological:  Negative for brief paralysis, dizziness, focal weakness, light-headedness, loss of balance, numbness, tremors and weakness.   Psychiatric/Behavioral:  Negative for altered mental status, depression and memory loss.    Allergic/Immunologic: Negative for hives and persistent infections.        Objective:      Vitals:    03/11/24 1404   BP: 98/65   Pulse: (!) 56   Weight: 62.3 kg (137 lb 5.6 oz)   Height: 5' 6" (1.676 m)   PainSc: 0-No pain     Body mass index is 22.17 kg/m².    Physical Exam            ..    Chemistry        Component Value Date/Time     01/30/2024 0945    K 4.1 01/30/2024 0945     01/30/2024 0945    CO2 26 01/30/2024 0945    BUN 9 01/30/2024 0945    CREATININE 0.9 01/30/2024 0945    GLU 81 01/30/2024 0945        Component Value Date/Time    CALCIUM 10.0 01/30/2024 0945    ALKPHOS 61 03/05/2024 1104    AST 16 03/05/2024 1104    ALT 27 03/05/2024 1104    BILITOT 0.6 03/05/2024 1104    ESTGFRAFRICA >60 06/14/2021 1602    EGFRNONAA >60 06/14/2021 1602            ..  Lab Results   Component Value Date    CHOL 162 01/30/2024     Lab Results   Component Value Date    HDL 44 01/30/2024     Lab Results   Component Value Date    LDLCALC 105.8 01/30/2024     Lab Results   Component Value Date    TRIG 61 01/30/2024     Lab Results   Component Value Date    CHOLHDL 27.2 01/30/2024     ..  Lab Results   Component Value Date    WBC 8.96 01/30/2024    HGB 15.2 01/30/2024    HCT 45.8 01/30/2024    MCV 92 01/30/2024     01/30/2024       Test(s) Reviewed  I have reviewed the following in detail:  [] Stress test   [] Angiography   [] Echocardiogram   [x] Labs   [x] Other:       Assessment:         ICD-10-CM ICD-9-CM   1. Precordial pain  R07.2 786.51   2. Systolic murmur  R01.1 785.2   3. Bradycardia  R00.1 427.89   4. Chronic hepatitis C without hepatic " coma  B18.2 070.54   5. Tobacco use  Z72.0 305.1     Problem List Items Addressed This Visit          Cardiac/Vascular    Precordial pain - Primary    Relevant Orders    IN OFFICE EKG 12-LEAD (to [place]) (Completed)    Echo    Exercise Stress - EKG    Systolic murmur    Relevant Orders    Echo    Bradycardia    Relevant Orders    Echo    Exercise Stress - EKG       GI    Chronic hepatitis C    Overview     prescribed harvoni. took only 1 month starting 8/27/21 and then lost to f/u. did  NOT complete full course.            Other    Tobacco use        Plan:     EKG SINUS BRADYCARDIA OTHERWISE NORMAL, WILL NEED FURTHER EVALUATION CHECK ECHOCARDIOGRAM EXERCISE STRESS EKG ASSESS FOR ISCHEMIA AND ASSESS HEART RATE RESPONSE VARIABILITY NO SYNCOPE RETURN TO CLINIC IN FEW WEEKS AFTER TESTS, TOBACCO CESSATION COUNSELING      Precordial pain  -     Ambulatory referral/consult to Cardiology  -     IN OFFICE EKG 12-LEAD (to [place])  -     Echo  -     Exercise Stress - EKG; Future    Systolic murmur  -     Echo    Bradycardia  -     Echo  -     Exercise Stress - EKG; Future    Chronic hepatitis C without hepatic coma    Tobacco use    RTC Low level/low impact aerobic exercise 5x's/wk. Heart healthy diet and risk factor modification.    See labs and med orders.    Aerobic exercise 5x's/wk. Heart healthy diet and risk factor modification.    See labs and med orders.

## 2024-03-12 LAB
OHS QRS DURATION: 100 MS
OHS QTC CALCULATION: 379 MS

## 2024-03-21 ENCOUNTER — OFFICE VISIT (OUTPATIENT)
Dept: HEPATOLOGY | Facility: CLINIC | Age: 29
End: 2024-03-21
Payer: COMMERCIAL

## 2024-03-21 DIAGNOSIS — K82.4 GALLBLADDER POLYP: ICD-10-CM

## 2024-03-21 DIAGNOSIS — B18.2 CHRONIC HEPATITIS C WITHOUT HEPATIC COMA: Primary | ICD-10-CM

## 2024-03-21 PROCEDURE — 3044F HG A1C LEVEL LT 7.0%: CPT | Mod: CPTII,95,, | Performed by: PHYSICIAN ASSISTANT

## 2024-03-21 PROCEDURE — 1159F MED LIST DOCD IN RCRD: CPT | Mod: CPTII,95,, | Performed by: PHYSICIAN ASSISTANT

## 2024-03-21 PROCEDURE — 99213 OFFICE O/P EST LOW 20 MIN: CPT | Mod: 95,,, | Performed by: PHYSICIAN ASSISTANT

## 2024-03-21 PROCEDURE — 1160F RVW MEDS BY RX/DR IN RCRD: CPT | Mod: CPTII,95,, | Performed by: PHYSICIAN ASSISTANT

## 2024-03-21 NOTE — PROGRESS NOTES
HEPATOLOGY VIDEO VISIT NOTE - HCV clinic  Visit type: audiovisual    Each patient to whom he or she provides medical services by telemedicine is:  (1) informed of the relationship between the physician and patient and the respective role of any other health care provider with respect to management of the patient; and (2) notified that he or she may decline to receive medical services by telemedicine and may withdraw from such care at any time.    CHIEF COMPLAINT: Hepatitis C     HISTORY       This is a 28 y.o. White male with chronic hepatitis C (F0-1 in 2021) & GB polyps on U/S, here for f/u    Interval history:  U/S abd - unremarkable liver. No SM or ascites. Stable 2mm GB polyp  FibroScan (2/1/24): F0-1    Began 8 weeks harvoni on 2/7/24   Virologic response: RNA <12, detected (3/5/24)   Transaminases: normalized   Adherence issues: missed 1 dose   Tolerability issues: Fatigue after taking dose.    Currently taking at lunch, would like to switch to evening dose    No jaundice, dark urine, hematemesis, melena, slowed mentation, abdominal distention.       HCV history:  Reports (-) HCV screening 3/2018 (screening done at his request after being released from longterm 11/2017; he saw report w/ neg HCV results)   Screened (+) for Hepatitis C late 2020 when he requested STD screening (he believes this was HCVAB only, RNA not done)  Prior icteric illnesses: None  Risks for HCV:  Tattoos, cut by scalpel (works in [employer]), prior incarceration    Mom has HCV acquired through drug use after pt's birth  - Treatment naive prior to harvoni  - Genotype 1a  - NS5A resistance not known      Liver staging:  FibroScan 6/2021 - kPa 5.1, F0-1 (, no signif steatosis)      PMH, PSH, SOCIAL HX, FAMILY HX       Reviewed in Epic  Pertinent findings:  FAMILY HX: mom w/ HCV  SOCIAL HX: Not , now has full custody of 5 y/o daughter. 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 Currently having 1-2 daiquiris per week  Drugs - denies  Spiritual - yes, [employer]      ROS: as per HPI  (+) GERD - doing fine w/ pepcid 40mg    PHYSICAL EXAM:  Friendly White male, in no acute distress; alert and oriented to person, place and time  LUNGS: Normal respiratory effort.  NEURO/PSYCH: Memory intact. Thought and speech pattern appropriate. Behavior normal. No depression or anxiety noted.      DIAGNOSTIC RESULTS      Lab Results   Component Value Date     01/30/2024    K 4.1 01/30/2024    BUN 9 01/30/2024    CREATININE 0.9 01/30/2024    ALBUMIN 4.2 03/05/2024    ALKPHOS 61 03/05/2024    BILITOT 0.6 03/05/2024    AST 16 03/05/2024    ALT 27 03/05/2024     Lab Results   Component Value Date    WBC 8.96 01/30/2024    HGB 15.2 01/30/2024     01/30/2024     Results for orders placed during the hospital encounter of 02/01/24    US Abdomen Limited  CLINICAL HISTORY:please include spleen for portal HTN assessment, GB polyp; Chronic viral hepatitis C  TECHNIQUE:Limited ultrasound of the right upper quadrant of the abdomen (including pancreas, liver, gallbladder, common bile duct, and spleen) was performed.  COMPARISON:  Ultrasound 06/30/2021    FINDINGS:  Pancreas: Partially visualized portions appear normal.    Liver: Normal in size, measuring 13.3 cm. Homogeneous echotexture. No focal hepatic lesions.    Gallbladder: Small gallstones noted.  Small adherent stone or 2 mm polyp.  No wall thickening or pericholecystic fluid.  No sonographic Lopez's sign.    Biliary system: The common duct is not dilated, measuring 3 mm.  No intrahepatic ductal dilatation.    Spleen: Normal in size and echotexture, measuring 9.3 x 3.1 cm.    Inferior vena cava: Within normal limits.    Impression  Cholelithiasis without sonographic evidence of acute cholecystitis.  0.2 cm adherent gallstone or small polyp.      ASSESSMENT       28 y.o. White male with:  1. CHRONIC HEPATITIS C, GENOTYPE 1a - on harvoni  -- FibroScan - F0-1 (2021, 2024)  -- (+) Immunity to HAV   -- lacking immunity to HBV 2021 -> s/p vaccine per above    2. GB POLYPS - stable 2mm, add'l monitoring not needed    PLAN        Continue Harvoni x 8 weeks. Ok to move timing to evening. Labs for SVR as planned      __________________________________________________________________    Duration of encounter: 21 min  This includes face-to-face time and non face-to-face time preparing to see the patient (eg, review of tests), obtaining and/or reviewing separately obtained history, documenting clinical information in the electronic or other health record, independently interpreting resultsand communicating results to the patient/family/caregiver, or care coordination.

## 2024-08-08 ENCOUNTER — TELEPHONE (OUTPATIENT)
Dept: HEPATOLOGY | Facility: CLINIC | Age: 29
End: 2024-08-08
Payer: COMMERCIAL

## 2024-08-09 ENCOUNTER — LAB VISIT (OUTPATIENT)
Dept: LAB | Facility: HOSPITAL | Age: 29
End: 2024-08-09
Payer: COMMERCIAL

## 2024-08-09 DIAGNOSIS — B18.2 CHRONIC HEPATITIS C WITHOUT HEPATIC COMA: ICD-10-CM

## 2024-08-09 LAB
ALBUMIN SERPL BCP-MCNC: 4.6 G/DL (ref 3.5–5.2)
ALP SERPL-CCNC: 67 U/L (ref 55–135)
ALT SERPL W/O P-5'-P-CCNC: 19 U/L (ref 10–44)
AST SERPL-CCNC: 16 U/L (ref 10–40)
BILIRUB DIRECT SERPL-MCNC: 0.1 MG/DL (ref 0.1–0.3)
BILIRUB SERPL-MCNC: 0.3 MG/DL (ref 0.1–1)
PROT SERPL-MCNC: 7.7 G/DL (ref 6–8.4)

## 2024-08-09 PROCEDURE — 87522 HEPATITIS C REVRS TRNSCRPJ: CPT | Performed by: PHYSICIAN ASSISTANT

## 2024-08-09 PROCEDURE — 36415 COLL VENOUS BLD VENIPUNCTURE: CPT | Mod: PO | Performed by: PHYSICIAN ASSISTANT

## 2024-08-09 PROCEDURE — 80076 HEPATIC FUNCTION PANEL: CPT | Performed by: PHYSICIAN ASSISTANT

## 2024-08-13 ENCOUNTER — TELEPHONE (OUTPATIENT)
Dept: HEPATOLOGY | Facility: CLINIC | Age: 29
End: 2024-08-13
Payer: COMMERCIAL

## 2024-08-13 DIAGNOSIS — Z86.19 HEPATITIS C VIRUS INFECTION CURED AFTER ANTIVIRAL DRUG THERAPY: Primary | ICD-10-CM

## 2024-08-13 LAB
HCV RNA SERPL QL NAA+PROBE: NOT DETECTED
HCV RNA SPEC NAA+PROBE-ACNC: NOT DETECTED IU/ML

## 2024-08-13 NOTE — TELEPHONE ENCOUNTER
Pt began 8 weeks Harvoni on 2/7/24  Anticipated treatment end date: 4/2/24  F 0-1  [place] 1A  Prior HCV treatment: No    Didn't have SVR labs early July 8/9 LFT normal, HCV neg    These labs document SVR following successful HCV treatment with Harvoni   This is consistent with a cure. Relapse is not expected.   No immunity is conferred and patient could be reinfected.     Pt has been notified by [facility]

## 2024-09-03 ENCOUNTER — OFFICE VISIT (OUTPATIENT)
Dept: FAMILY MEDICINE | Facility: CLINIC | Age: 29
End: 2024-09-03
Payer: COMMERCIAL

## 2024-09-03 DIAGNOSIS — L01.00 IMPETIGO: Primary | ICD-10-CM

## 2024-09-03 PROCEDURE — 1159F MED LIST DOCD IN RCRD: CPT | Mod: CPTII,95,, | Performed by: STUDENT IN AN ORGANIZED HEALTH CARE EDUCATION/TRAINING PROGRAM

## 2024-09-03 PROCEDURE — 3044F HG A1C LEVEL LT 7.0%: CPT | Mod: CPTII,95,, | Performed by: STUDENT IN AN ORGANIZED HEALTH CARE EDUCATION/TRAINING PROGRAM

## 2024-09-03 PROCEDURE — 99213 OFFICE O/P EST LOW 20 MIN: CPT | Mod: 95,,, | Performed by: STUDENT IN AN ORGANIZED HEALTH CARE EDUCATION/TRAINING PROGRAM

## 2024-09-03 PROCEDURE — 1160F RVW MEDS BY RX/DR IN RCRD: CPT | Mod: CPTII,95,, | Performed by: STUDENT IN AN ORGANIZED HEALTH CARE EDUCATION/TRAINING PROGRAM

## 2024-09-03 RX ORDER — MUPIROCIN 20 MG/G
OINTMENT TOPICAL 3 TIMES DAILY
Qty: 30 G | Refills: 1 | Status: SHIPPED | OUTPATIENT
Start: 2024-09-03 | End: 2024-09-10

## 2024-09-03 NOTE — PROGRESS NOTES
The patient location is:  Patient Home [place]  The chief complaint leading to consultation is:   Chief Complaint   Patient presents with    Facial rash     +sick contact      Total time spent with patient: 15 min     Visit type: Virtual visit with synchronous audio and video  Each patient to whom he or she provides medical services by telemedicine is:  (1) informed of the relationship between the physician and patient and the respective role of any other health care provider with respect to management of the patient; and (2) notified that he or she may decline to receive medical services by telemedicine and may withdraw from such care at any time.    This service was not originating from a related E/M service provided within the previous 7 days nor will  to an E/M service or procedure within the next 24 hours or my soonest available appointment.  Prevailing standard of care was able to be met in this synchronous audio and video visit    Subjective:    Chief Complaint:   Chief Complaint   Patient presents with    Facial rash     +sick contact      274}  HPI:  29 y.o. male presents for evaluation has facial rash.  He reports  his infant daughter was recently diagnosed with impetigo.  They share the same bed.  He has now noticed vesicular rash near nasolabial folds, as well as in nares.    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        on The HPI and pertinent ROS is included in the Diagnostic Impression Remarks section at the end of the note. Please see below for further details.     The following portions of the patient's history were reviewed and updated as appropriate: allergies, current medications, past family history, past medical history, past social history, past surgical history and problem list.  Past Medical History:   Diagnosis Date    ADHD (attention deficit hyperactivity disorder)     Hepatitis C virus infection cured after  "antiviral drug therapy     treated / cured (SVR - 8/2024) (F0-1)    Seizures      Past Surgical History:   Procedure Laterality Date    ADENOIDECTOMY      APPENDECTOMY       Social History  Social History     Tobacco Use    Smoking status: Former     Types: Vaping w/o nicotine    Smokeless tobacco: Never   Substance Use Topics    Alcohol use: Yes    Drug use: Yes     Types: Marijuana     Family History   Problem Relation Name Age of Onset    Drug abuse Mother      Lupus Neg Hx      Melanoma Neg Hx      Psoriasis Neg Hx       Review of patient's allergies indicates:  No Known Allergies  274}  Physical Examination  There were no vitals taken for this visit.  Wt Readings from Last 3 Encounters:   03/11/24 62.3 kg (137 lb 5.6 oz)   02/29/24 61 kg (134 lb 7.7 oz)   02/15/24 61 kg (134 lb 7.7 oz)     BP Readings from Last 3 Encounters:   03/11/24 98/65   02/15/24 132/84   02/14/24 108/72     Estimated body mass index is 22.17 kg/m² as calculated from the following:    Height as of 3/11/24: 5' 6" (1.676 m).    Weight as of 3/11/24: 62.3 kg (137 lb 5.6 oz).       CONSTITUTIONAL: No apparent distress. Does not appear acutely ill or septic. Appears adequately hydrated.  PULM: Breathing unlabored.  PSYCHIATRIC: Alert and conversant and grossly oriented. Mood is grossly neutral. Affect appropriate. Judgment and insight grossly intact.  Integument: normal coloration and turgor, no rashes, no suspicious skin lesions noted.    Data reviewed  Previous medical records reviewed and summarized in HPI.   274}    Laboratory  I have reviewed old labs below:  Lab Results   Component Value Date    WBC 8.96 01/30/2024    HGB 15.2 01/30/2024    HCT 45.8 01/30/2024    MCV 92 01/30/2024     01/30/2024     01/30/2024    K 4.1 01/30/2024     01/30/2024    CALCIUM 10.0 01/30/2024    CO2 26 01/30/2024    GLU 81 01/30/2024    BUN 9 01/30/2024    CREATININE 0.9 01/30/2024    ANIONGAP 10 01/30/2024    ESTGFRAFRICA >60 06/14/2021    " "EGFRNONAA >60 06/14/2021    PROT 7.7 08/09/2024    ALBUMIN 4.6 08/09/2024    BILITOT 0.3 08/09/2024    ALKPHOS 67 08/09/2024    ALT 19 08/09/2024    AST 16 08/09/2024    CHOL 162 01/30/2024    TRIG 61 01/30/2024    HDL 44 01/30/2024    LDLCALC 105.8 01/30/2024    HGBA1C 5.3 01/30/2024     Lab reviewed by me: Particular labs of significance that I will monitor, workup, or treat to improve are mentioned below in diagnostic impression remarks.  274}  Imaging/EKG: I have reviewed the pertinent results/findings and my personal findings are noted below in diagnostic impression remarks.     Assessment/Plan  David Knutson is a 29 y.o. male who presents to clinic with:    1. Impetigo         Diagnostic Impression Remarks + HPI     Documentation entered by me for this encounter may have been done in part using speech-recognition technology. Although I have made an effort to ensure accuracy, "sound like" errors may exist and should be interpreted in context.           This is the extent of the patient's complaints at this present time. He denies chest pain upon exertion, dyspnea, nausea, vomiting, diaphoresis, and syncope. No pleuritic chest pain, unilateral leg swelling, calf tenderness, or calf pain.     David will return to clinic in a few months for further workup and reassessment or sooner as needed. He was instructed to call the clinic or go to the emergency department if his symptoms do not improve, worsens, or if new symptoms develop. As we discussed that symptoms could worsen over the next 24 hours he was advised that if any increased swelling, pain, or numbness arise to go immediately to the ED. Patient knows to call any time if an emergency arises. Shared decision making occurred and he verbalized understanding in agreement with this plan.   274}  BMI Goal    Counseled patient on his ideal body weight, health consequences of being obese and current recommendations including weekly exercise and a heart healthy " "diet.  He is aware that ideal BMI < 25  Estimated body mass index is 22.17 kg/m² as calculated from the following:    Height as of 3/11/24: 5' 6" (1.676 m).    Weight as of 3/11/24: 62.3 kg (137 lb 5.6 oz).     He was counseled about the importance of healthy dietary habits as well as routine physical activity and exercise for better health outcomes. I also discussed the importance of cancer screening.     Medication Monitoring    In today's visit, monitoring for drug toxicity was accomplished. Proper use of medications was also discussed.     Counseling    Eat Less Unhealthy Fat   -Cut back on saturated fats and trans (also called hydrogenated) fats. A diet thats high in these fats increases your bad cholesterol. Its not enough to just cut back on foods containing cholesterol.   -Eat about 2 servings of fish per week. Most fish contain omega-3 fatty acids. These help lower blood cholesterol.   -Eat more whole grains and soluble fiber (such as oat bran). These lower overall cholesterol.   -Counseled that most cholesterol is made in the liver and only a minority comes from diet.    Be Active   -Choose an activity you enjoy. Walking, swimming, and riding a bike are some good ways to be active.   -Start at a level where you feel comfortable. Increase your time and pace a little each week.   -Work up to 30 minutes on most days. You can break this up into three 10-minute periods.   -Remember, some activity is better than none.   -If you havent been exercising regularly, start slowly. Check with your doctor to make sure the exercise plan is right for you.     Take Medication As Directed: Many patients need medication to get their LDL levels to a safe level. Medication to lower cholesterol levels is effective and safe. (But taking medication is not a substitute for exercise or watching your diet!).    I discussed imaging findings, diagnosis, possibilities, treatment options, medications, risks, and benefits. He had many " questions regarding the options and long-term effects. All questions were answered. He expressed understanding after counseling regarding the diagnosis and recommendations. He was capable and demonstrated competence with understanding of these options. Shared decision making was performed resulting in him choosing the current treatment plan.     I also discussed the importance of close follow up to discuss labs, change or modify his medications if needed, monitor side effects, and further evaluation of medical problems.     Additional workup planned: see labs ordered below.    See below for labs and meds ordered with associated diagnosis    1. Impetigo  - mupirocin (BACTROBAN) 2 % ointment; Apply topically 3 (three) times daily. Apply to Sides of Nose, and in Nares for 7 days  Dispense: 30 g; Refill: 1   Recommended frequent hand washing  Avoiding sharing towels, clothing or bedding with infected person.  Recommended daily cleansing of face.    Medication List with Changes/Refills   New Medications    MUPIROCIN (BACTROBAN) 2 % OINTMENT    Apply topically 3 (three) times daily. Apply to Sides of Nose, and in Nares for 7 days   Current Medications    ALBUTEROL (PROAIR HFA) 90 MCG/ACTUATION INHALER    Inhale 2 puffs into the lungs every 4 (four) hours as needed for Wheezing. Rescue    CLONAZEPAM (KLONOPIN) 1 MG TABLET    Take 1 mg by mouth.    DEXTROAMPHETAMINE-AMPHETAMINE (ADDERALL) 20 MG TABLET    Take 1 tablet by mouth 2 (two) times daily.    ESCITALOPRAM OXALATE (LEXAPRO) 20 MG TABLET    Take 20 mg by mouth once daily.    FAMOTIDINE (PEPCID) 40 MG TABLET    Take 1 tablet (40 mg total) by mouth once daily. Take at same time as harvoni    IBUPROFEN (ADVIL,MOTRIN) 600 MG TABLET    Take 1 tablet (600 mg total) by mouth every 8 (eight) hours as needed for Pain.    IMIQUIMOD (ALDARA) 5 % CREAM    Apply topically 3 (three) times a week. leave on skin for 6-10 hours. Remove by washing with mild soap and water.     "KETOCONAZOLE (NIZORAL) 2 % CREAM    Apply topically 2 (two) times daily.    KETOCONAZOLE (NIZORAL) 2 % SHAMPOO    Apply topically once daily.    PHENOL (CHLORASEPTIC THROAT SPRAY) 1.4 % SPRA    by Mucous Membrane route 4 (four) times daily as needed (sore throat).    PREDNISONE (DELTASONE) 10 MG TABLET    3 pills daily for 3 days then 2 pills daily for 3 days and then 1 pill daily for 3 day    VALACYCLOVIR (VALTREX) 500 MG TABLET    Take 500 mg by mouth 2 (two) times daily as needed.     Modified Medications    No medications on file       Berna Hackett DO  09/03/2024     Documentation entered by me for this encounter may have been done in part using speech-recognition technology. Although I have made an effort to ensure accuracy, "sound like" errors may exist and should be interpreted in context.  "

## 2024-09-06 ENCOUNTER — OFFICE VISIT (OUTPATIENT)
Dept: FAMILY MEDICINE | Facility: CLINIC | Age: 29
End: 2024-09-06
Payer: COMMERCIAL

## 2024-09-06 DIAGNOSIS — L01.00 IMPETIGO: Primary | ICD-10-CM

## 2024-09-06 RX ORDER — AMOXICILLIN AND CLAVULANATE POTASSIUM 875; 125 MG/1; MG/1
1 TABLET, FILM COATED ORAL EVERY 12 HOURS
Qty: 14 TABLET | Refills: 0 | Status: SHIPPED | OUTPATIENT
Start: 2024-09-06

## 2024-09-06 NOTE — PROGRESS NOTES
Subjective:       Patient ID: David Knutson is a 29 y.o. male.    Chief Complaint: No chief complaint on file.  ..The patient location is: [place]  The chief complaint leading to consultation is: Impetigo    Visit type: audiovisual    Face to Face time with patient: 15  minutes of total time spent on the encounter, which includes face to face time and non-face to face time preparing to see the patient (eg, review of tests), Obtaining and/or reviewing separately obtained history, Documenting clinical information in the electronic or other health record, Independently interpreting results (not separately reported) and communicating results to the patient/family/caregiver, or Care coordination (not separately reported).     Each patient to whom he or she provides medical services by telemedicine is:  (1) informed of the relationship between the physician and patient and the respective role of any other health care provider with respect to management of the patient; and (2) notified that he or she may decline to receive medical services by telemedicine and may withdraw from such care at any time.    Answers submitted by the patient for this visit:  Rash Questionnaire (Submitted on 9/5/2024)  Chief Complaint: Rash  Chronicity: new  Onset: 1 to 4 weeks ago  Progression since onset: gradually worsening  Affected locations: face  Characteristics: blistering, dryness, pain, redness, swelling, draining, peeling, scaling  anorexia: No  congestion: Yes  cough: Yes  diarrhea: No  facial edema: No  fatigue: Yes  fever: No  rhinorrhea: Yes  sore throat: No  vomiting: No  Treatments tried: antibiotic cream  Improvement on treatment: no relief  asthma: No  allergies: No  eczema: No  varicella: No    Notes:   -  The pt is a 28 y/o male that presents with c/o 1 week h/o of Impetigo treating with Mupirocin dx by PCP but is not improving in his bilateral nares, has spread to his right hand, is not feeling well, and is nauseous. Denies  fever.  -    Past Medical History:   Diagnosis Date    ADHD (attention deficit hyperactivity disorder)     Hepatitis C virus infection cured after antiviral drug therapy     treated / cured (SVR - 8/2024) (F0-1)    Seizures        Past Surgical History:   Procedure Laterality Date    ADENOIDECTOMY      APPENDECTOMY          Social History     Socioeconomic History    Marital status: Single   Tobacco Use    Smoking status: Former     Types: Vaping w/o nicotine    Smokeless tobacco: Never   Substance and Sexual Activity    Alcohol use: Yes    Drug use: Yes     Types: Marijuana    Sexual activity: Yes     Social Determinants of Health     Financial Resource Strain: High Risk (9/3/2024)    Overall Financial Resource Strain (CARDIA)     Difficulty of Paying Living Expenses: Hard   Food Insecurity: Food Insecurity Present (9/3/2024)    Hunger Vital Sign     Worried About Running Out of Food in the Last Year: Often true     Ran Out of Food in the Last Year: Often true   Transportation Needs: No Transportation Needs (3/21/2024)    PRAPARE - Transportation     Lack of Transportation (Medical): No     Lack of Transportation (Non-Medical): No   Physical Activity: Sufficiently Active (9/3/2024)    Exercise Vital Sign     Days of Exercise per Week: 6 days     Minutes of Exercise per Session: 60 min   Stress: Stress Concern Present (9/3/2024)    [employer] - Occupational Stress Questionnaire     Feeling of Stress : To some extent   Housing Stability: High Risk (9/3/2024)    Housing Stability Vital Sign     Unable to Pay for Housing in the Last Year: Yes       Family History   Problem Relation Name Age of Onset    Drug abuse Mother      Lupus Neg Hx      Melanoma Neg Hx      Psoriasis Neg Hx         Review of patient's allergies indicates:  No Known Allergies       Current Outpatient Medications:     albuterol (PROAIR HFA) 90 mcg/actuation inhaler, Inhale 2 puffs into the lungs every 4 (four) hours as  needed for Wheezing. Rescue, Disp: 18 g, Rfl: 2    amoxicillin-clavulanate 875-125mg (AUGMENTIN) 875-125 mg per tablet, Take 1 tablet by mouth every 12 (twelve) hours., Disp: 14 tablet, Rfl: 0    clonazePAM (KLONOPIN) 1 MG tablet, Take 1 mg by mouth., Disp: , Rfl:     dextroamphetamine-amphetamine (ADDERALL) 20 mg tablet, Take 1 tablet by mouth 2 (two) times daily., Disp: , Rfl:     EScitalopram oxalate (LEXAPRO) 20 MG tablet, Take 20 mg by mouth once daily., Disp: , Rfl:     famotidine (PEPCID) 40 MG tablet, Take 1 tablet (40 mg total) by mouth once daily. Take at same time as harvoni, Disp: 30 tablet, Rfl: 2    ibuprofen (ADVIL,MOTRIN) 600 MG tablet, Take 1 tablet (600 mg total) by mouth every 8 (eight) hours as needed for Pain., Disp: 42 tablet, Rfl: 0    imiquimod (ALDARA) 5 % cream, Apply topically 3 (three) times a week. leave on skin for 6-10 hours. Remove by washing with mild soap and water. (Patient not taking: Reported on 3/11/2024), Disp: 12 packet, Rfl: 3    ketoconazole (NIZORAL) 2 % cream, Apply topically 2 (two) times daily., Disp: 60 g, Rfl: 2    ketoconazole (NIZORAL) 2 % shampoo, Apply topically once daily., Disp: 120 mL, Rfl: 3    mupirocin (BACTROBAN) 2 % ointment, Apply topically 3 (three) times daily. Apply to Sides of Nose, and in Nares for 7 days, Disp: 30 g, Rfl: 1    phenoL (CHLORASEPTIC THROAT SPRAY) 1.4 % SprA, by Mucous Membrane route 4 (four) times daily as needed (sore throat). (Patient not taking: Reported on 3/11/2024), Disp: 177 mL, Rfl: 0    predniSONE (DELTASONE) 10 MG tablet, 3 pills daily for 3 days then 2 pills daily for 3 days and then 1 pill daily for 3 day (Patient not taking: Reported on 2/29/2024), Disp: 18 tablet, Rfl: 0    valACYclovir (VALTREX) 500 MG tablet, Take 500 mg by mouth 2 (two) times daily as needed., Disp: , Rfl:     Rash  This is a new problem. The current episode started 1 to 4 weeks ago. The problem has been gradually worsening since onset. The affected  locations include the face. The rash is characterized by blistering, dryness, pain, redness, swelling, draining, peeling and scaling. Associated symptoms include congestion, coughing, fatigue and rhinorrhea. Pertinent negatives include no anorexia, diarrhea, facial edema, fever, sore throat or vomiting. Past treatments include antibiotic cream. The treatment provided no relief. There is no history of allergies, asthma, eczema or varicella.     Review of Systems   Constitutional:  Positive for fatigue. Negative for fever.   HENT:  Positive for congestion, drooling and rhinorrhea. Negative for sore throat.    Respiratory:  Positive for cough.    Gastrointestinal:  Negative for anorexia, diarrhea and vomiting.   Endocrine: Positive for polyuria.   Skin:  Positive for rash.       Objective:      Physical Exam  Constitutional:       General: He is not in acute distress.     Appearance: Normal appearance. He is normal weight. He is not ill-appearing.   HENT:      Head: Normocephalic.   Eyes:      Conjunctiva/sclera: Conjunctivae normal.   Pulmonary:      Effort: Pulmonary effort is normal.   Musculoskeletal:         General: Normal range of motion.      Cervical back: Normal range of motion.   Skin:     Coloration: Skin is not jaundiced or pale.   Neurological:      Mental Status: He is alert and oriented to person, place, and time.   Psychiatric:         Mood and Affect: Mood normal.         Behavior: Behavior normal.         Thought Content: Thought content normal.         Judgment: Judgment normal.         Assessment:       1. Impetigo        Plan:     1- cont use of Mupirocin   2- augmentin bid with food  3- f/u with PCP PRN  -    Impetigo  -     amoxicillin-clavulanate 875-125mg (AUGMENTIN) 875-125 mg per tablet; Take 1 tablet by mouth every 12 (twelve) hours.  Dispense: 14 tablet; Refill: 0        Risks, benefits, and side effects were discussed with the patient. All questions were answered to the fullest  satisfaction of the patient, and pt verbalized understanding and agreement to treatment plan. Pt was to call with any new or worsening symptoms, or present to the ER.

## 2024-10-07 ENCOUNTER — LAB VISIT (OUTPATIENT)
Dept: LAB | Facility: HOSPITAL | Age: 29
End: 2024-10-07
Attending: NURSE PRACTITIONER
Payer: COMMERCIAL

## 2024-10-07 ENCOUNTER — OFFICE VISIT (OUTPATIENT)
Dept: FAMILY MEDICINE | Facility: CLINIC | Age: 29
End: 2024-10-07
Payer: COMMERCIAL

## 2024-10-07 VITALS
HEIGHT: 66 IN | TEMPERATURE: 98 F | HEART RATE: 69 BPM | SYSTOLIC BLOOD PRESSURE: 100 MMHG | WEIGHT: 137.38 LBS | BODY MASS INDEX: 22.08 KG/M2 | DIASTOLIC BLOOD PRESSURE: 80 MMHG | OXYGEN SATURATION: 99 %

## 2024-10-07 DIAGNOSIS — Z11.3 ROUTINE SCREENING FOR STI (SEXUALLY TRANSMITTED INFECTION): ICD-10-CM

## 2024-10-07 DIAGNOSIS — Z11.3 ROUTINE SCREENING FOR STI (SEXUALLY TRANSMITTED INFECTION): Primary | ICD-10-CM

## 2024-10-07 LAB
HBV SURFACE AG SERPL QL IA: NORMAL
HCV AB SERPL QL IA: REACTIVE
HIV 1+2 AB+HIV1 P24 AG SERPL QL IA: REACTIVE
TREPONEMA PALLIDUM IGG+IGM AB [PRESENCE] IN SERUM OR PLASMA BY IMMUNOASSAY: NONREACTIVE

## 2024-10-07 PROCEDURE — 87389 HIV-1 AG W/HIV-1&-2 AB AG IA: CPT | Mod: 91 | Performed by: NURSE PRACTITIONER

## 2024-10-07 PROCEDURE — 87389 HIV-1 AG W/HIV-1&-2 AB AG IA: CPT | Performed by: NURSE PRACTITIONER

## 2024-10-07 PROCEDURE — 3074F SYST BP LT 130 MM HG: CPT | Mod: CPTII,S$GLB,, | Performed by: NURSE PRACTITIONER

## 2024-10-07 PROCEDURE — 3044F HG A1C LEVEL LT 7.0%: CPT | Mod: CPTII,S$GLB,, | Performed by: NURSE PRACTITIONER

## 2024-10-07 PROCEDURE — 87340 HEPATITIS B SURFACE AG IA: CPT | Performed by: NURSE PRACTITIONER

## 2024-10-07 PROCEDURE — 1160F RVW MEDS BY RX/DR IN RCRD: CPT | Mod: CPTII,S$GLB,, | Performed by: NURSE PRACTITIONER

## 2024-10-07 PROCEDURE — 99214 OFFICE O/P EST MOD 30 MIN: CPT | Mod: S$GLB,,, | Performed by: NURSE PRACTITIONER

## 2024-10-07 PROCEDURE — 86593 SYPHILIS TEST NON-TREP QUANT: CPT | Performed by: NURSE PRACTITIONER

## 2024-10-07 PROCEDURE — 1159F MED LIST DOCD IN RCRD: CPT | Mod: CPTII,S$GLB,, | Performed by: NURSE PRACTITIONER

## 2024-10-07 PROCEDURE — 99999 PR PBB SHADOW E&M-EST. PATIENT-LVL IV: CPT | Mod: PBBFAC,,, | Performed by: NURSE PRACTITIONER

## 2024-10-07 PROCEDURE — 3008F BODY MASS INDEX DOCD: CPT | Mod: CPTII,S$GLB,, | Performed by: NURSE PRACTITIONER

## 2024-10-07 PROCEDURE — 3079F DIAST BP 80-89 MM HG: CPT | Mod: CPTII,S$GLB,, | Performed by: NURSE PRACTITIONER

## 2024-10-07 PROCEDURE — 86803 HEPATITIS C AB TEST: CPT | Performed by: NURSE PRACTITIONER

## 2024-10-07 PROCEDURE — 36415 COLL VENOUS BLD VENIPUNCTURE: CPT | Mod: PO | Performed by: NURSE PRACTITIONER
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
## 2024-10-08 ENCOUNTER — PATIENT MESSAGE (OUTPATIENT)
Dept: FAMILY MEDICINE | Facility: CLINIC | Age: 29
End: 2024-10-08
Payer: COMMERCIAL

## 2024-10-08 DIAGNOSIS — Z21 HIV-1 INFECTION: Primary | ICD-10-CM

## 2024-10-08 LAB
HIV SUPPLEMENTAL ASSAY INTERPRETATION: ABNORMAL
HIV-1 RESULT: POSITIVE
HIV-2 RESULT: NEGATIVE

## 2024-10-09 ENCOUNTER — TELEPHONE (OUTPATIENT)
Dept: INFECTIOUS DISEASES | Facility: CLINIC | Age: 29
End: 2024-10-09
Payer: COMMERCIAL

## 2024-10-09 ENCOUNTER — TELEPHONE (OUTPATIENT)
Dept: FAMILY MEDICINE | Facility: CLINIC | Age: 29
End: 2024-10-09
Payer: COMMERCIAL

## 2024-10-09 NOTE — TELEPHONE ENCOUNTER
----- Message from Med Assistant Patel sent at 10/9/2024 10:03 AM CDT -----  Regarding: FW: Pt called wld like to get an earlier appt new HIV diagnosis has referral  Contact: [phone]    ----- Message -----  From: Griselda Suarez  Sent: 10/9/2024   9:32 AM CDT  To: #  Subject: Pt called wld like to get an earlier appt ne#    Name of Who is Calling:ANKUSH LOPEZ [[id number]]        What is the request in detail:Pt called wld like to get an earlier appt new HIV diagnosis has referral. Please advise         Can the clinic reply by [facility]:No        What Number to Call Back if not in [facility]: Telephone Information:  Mobile          [phone]

## 2024-10-09 NOTE — TELEPHONE ENCOUNTER
----- Message from Bertrand Flynn NP sent at 10/8/2024 11:20 PM CDT -----  Negative for chlamydia and gonorrhoeae.

## 2024-10-09 NOTE — TELEPHONE ENCOUNTER
----- Message from Bertrand Flynn NP sent at 10/8/2024  5:35 PM CDT -----  Unfortunately, HIV test has come back positive. I have tried to reach you several times, but have been unable to make contact. I recommend that you follow up with infection clinic for the treatment which can help manage your health and lower the risk of transmission. You will be contacted with the appointment. It is also advised that your partner gets tested. Please ensure that you use condoms during sexual activity.   Negative for hepatitis b infection and syphilis. Hepatitis c ab is positive which can remain positive even after treatment. This is because the body produces these antibodies in response to the infection, and they can remain for years, even if the infection is no longer active. Last HCV Quantitative was not detected in 8/2024. May need additional work up to ensure there is no re infection.

## 2024-10-09 NOTE — TELEPHONE ENCOUNTER
Patient viewed lab results & recommendations online on 10-9-24 @ 8:44 AM.  Writer provided patient with number to call and schedule appointment with infectious disease.  Patient is requesting to send message to provider to follow up on orders for additional work up to ensure there is no Hepatitis C reinfection.  Message forwarded to provider as per patient's request.      Please advise. Thank you.

## 2024-10-09 NOTE — TELEPHONE ENCOUNTER
Kat Santana Staff     ----- Message from Kat sent at 10/9/2024  8:51 AM CDT -----  Contact: David [phone]  Type:  Patient Returning Call    Who Called:David    Who Left Message for Patient:office    Does the patient know what this is regarding?:Results    Would the patient rather a call back or a response via [facility]? call    Best Call Back Number:[phone]       Additional Information:  Please call back to advise. Thank you!

## 2024-10-10 ENCOUNTER — TELEPHONE (OUTPATIENT)
Dept: FAMILY MEDICINE | Facility: CLINIC | Age: 29
End: 2024-10-10
Payer: COMMERCIAL

## 2024-10-10 DIAGNOSIS — R76.8 POSITIVE HEPATITIS C ANTIBODY TEST: Primary | ICD-10-CM

## 2024-10-11 NOTE — TELEPHONE ENCOUNTER
Lab appointment scheduled; patient agreed to appointment date, time, and location.      Bertrand Flynn NP  You9 hours ago (10:57 PM)       Lab order placed and please assist scheduling.  Thank you.

## 2024-10-28 ENCOUNTER — TELEPHONE (OUTPATIENT)
Dept: INFECTIOUS DISEASES | Facility: CLINIC | Age: 29
End: 2024-10-28
Payer: COMMERCIAL

## 2025-01-30 ENCOUNTER — TELEPHONE (OUTPATIENT)
Dept: INFECTIOUS DISEASES | Facility: CLINIC | Age: 30
End: 2025-01-30
Payer: COMMERCIAL

## 2025-01-30 NOTE — TELEPHONE ENCOUNTER
Spoke to pt scheduled his f/u appt with  02/27, also mailed a letter reminder.    ----- Message from Charisma sent at 1/30/2025 12:19 PM CST -----  Type:  Patient Returning Call    Who Called: pt   Who Left Message for Patient:pt   Does the patient know what this is regarding?:pt need a call to get an appt he missed for new hiv appt   Would the patient rather a call back or a response via [facility]? call  Best Call Back Number:[phone]  Additional Information: call back